# Patient Record
Sex: FEMALE | Race: WHITE | NOT HISPANIC OR LATINO | Employment: OTHER | URBAN - METROPOLITAN AREA
[De-identification: names, ages, dates, MRNs, and addresses within clinical notes are randomized per-mention and may not be internally consistent; named-entity substitution may affect disease eponyms.]

---

## 2017-04-07 ENCOUNTER — APPOINTMENT (OUTPATIENT)
Dept: CT IMAGING | Facility: CLINIC | Age: 77
End: 2017-04-07
Attending: EMERGENCY MEDICINE
Payer: MEDICARE

## 2017-04-07 ENCOUNTER — APPOINTMENT (OUTPATIENT)
Dept: ULTRASOUND IMAGING | Facility: CLINIC | Age: 77
End: 2017-04-07
Attending: EMERGENCY MEDICINE
Payer: MEDICARE

## 2017-04-07 ENCOUNTER — HOSPITAL ENCOUNTER (EMERGENCY)
Facility: CLINIC | Age: 77
Discharge: HOME | End: 2017-04-07
Attending: EMERGENCY MEDICINE | Admitting: EMERGENCY MEDICINE
Payer: MEDICARE

## 2017-04-07 VITALS
DIASTOLIC BLOOD PRESSURE: 67 MMHG | WEIGHT: 172 LBS | BODY MASS INDEX: 33.77 KG/M2 | TEMPERATURE: 98 F | OXYGEN SATURATION: 94 % | HEART RATE: 65 BPM | HEIGHT: 60 IN | SYSTOLIC BLOOD PRESSURE: 167 MMHG | RESPIRATION RATE: 16 BRPM

## 2017-04-07 DIAGNOSIS — N83.201 CYST OF RIGHT OVARY: ICD-10-CM

## 2017-04-07 DIAGNOSIS — R10.31 RLQ ABDOMINAL PAIN: ICD-10-CM

## 2017-04-07 LAB
ALBUMIN SERPL-MCNC: 3.2 G/DL (ref 3.4–5)
ALBUMIN UR-MCNC: NEGATIVE MG/DL
ALP SERPL-CCNC: 73 U/L (ref 40–150)
ALT SERPL W P-5'-P-CCNC: 25 U/L (ref 0–50)
ANION GAP SERPL CALCULATED.3IONS-SCNC: 8 MMOL/L (ref 3–14)
APPEARANCE UR: CLEAR
AST SERPL W P-5'-P-CCNC: 16 U/L (ref 0–45)
BACTERIA #/AREA URNS HPF: ABNORMAL /HPF
BASOPHILS # BLD AUTO: 0 10E9/L (ref 0–0.2)
BASOPHILS NFR BLD AUTO: 0.2 %
BILIRUB SERPL-MCNC: 0.5 MG/DL (ref 0.2–1.3)
BILIRUB UR QL STRIP: NEGATIVE
BUN SERPL-MCNC: 23 MG/DL (ref 7–30)
CALCIUM SERPL-MCNC: 8.9 MG/DL (ref 8.5–10.1)
CHLORIDE SERPL-SCNC: 104 MMOL/L (ref 94–109)
CO2 SERPL-SCNC: 31 MMOL/L (ref 20–32)
COLOR UR AUTO: YELLOW
CREAT SERPL-MCNC: 0.63 MG/DL (ref 0.52–1.04)
DIFFERENTIAL METHOD BLD: ABNORMAL
EOSINOPHIL # BLD AUTO: 0 10E9/L (ref 0–0.7)
EOSINOPHIL NFR BLD AUTO: 0.2 %
ERYTHROCYTE [DISTWIDTH] IN BLOOD BY AUTOMATED COUNT: 13.2 % (ref 10–15)
GFR SERPL CREATININE-BSD FRML MDRD: ABNORMAL ML/MIN/1.7M2
GLUCOSE SERPL-MCNC: 96 MG/DL (ref 70–99)
GLUCOSE UR STRIP-MCNC: NEGATIVE MG/DL
HCT VFR BLD AUTO: 41.7 % (ref 35–47)
HGB BLD-MCNC: 14.4 G/DL (ref 11.7–15.7)
HGB UR QL STRIP: NEGATIVE
HYALINE CASTS #/AREA URNS LPF: 1 /LPF (ref 0–2)
IMM GRANULOCYTES # BLD: 0 10E9/L (ref 0–0.4)
IMM GRANULOCYTES NFR BLD: 0.1 %
KETONES UR STRIP-MCNC: NEGATIVE MG/DL
LEUKOCYTE ESTERASE UR QL STRIP: ABNORMAL
LIPASE SERPL-CCNC: 164 U/L (ref 73–393)
LYMPHOCYTES # BLD AUTO: 1.8 10E9/L (ref 0.8–5.3)
LYMPHOCYTES NFR BLD AUTO: 21.3 %
MCH RBC QN AUTO: 33.6 PG (ref 26.5–33)
MCHC RBC AUTO-ENTMCNC: 34.5 G/DL (ref 31.5–36.5)
MCV RBC AUTO: 97 FL (ref 78–100)
MONOCYTES # BLD AUTO: 0.9 10E9/L (ref 0–1.3)
MONOCYTES NFR BLD AUTO: 10.5 %
MUCOUS THREADS #/AREA URNS LPF: PRESENT /LPF
NEUTROPHILS # BLD AUTO: 5.6 10E9/L (ref 1.6–8.3)
NEUTROPHILS NFR BLD AUTO: 67.7 %
NITRATE UR QL: NEGATIVE
NRBC # BLD AUTO: 0 10*3/UL
NRBC BLD AUTO-RTO: 0 /100
PH UR STRIP: 6.5 PH (ref 5–7)
PLATELET # BLD AUTO: 201 10E9/L (ref 150–450)
POTASSIUM SERPL-SCNC: 3.5 MMOL/L (ref 3.4–5.3)
PROT SERPL-MCNC: 6.9 G/DL (ref 6.8–8.8)
RBC # BLD AUTO: 4.28 10E12/L (ref 3.8–5.2)
RBC #/AREA URNS AUTO: 1 /HPF (ref 0–2)
SODIUM SERPL-SCNC: 143 MMOL/L (ref 133–144)
SP GR UR STRIP: 1.03 (ref 1–1.03)
SQUAMOUS #/AREA URNS AUTO: 1 /HPF (ref 0–1)
URN SPEC COLLECT METH UR: ABNORMAL
UROBILINOGEN UR STRIP-MCNC: NORMAL MG/DL (ref 0–2)
WBC # BLD AUTO: 8.2 10E9/L (ref 4–11)
WBC #/AREA URNS AUTO: 2 /HPF (ref 0–2)

## 2017-04-07 PROCEDURE — 85025 COMPLETE CBC W/AUTO DIFF WBC: CPT | Performed by: EMERGENCY MEDICINE

## 2017-04-07 PROCEDURE — 96375 TX/PRO/DX INJ NEW DRUG ADDON: CPT

## 2017-04-07 PROCEDURE — 96374 THER/PROPH/DIAG INJ IV PUSH: CPT | Mod: 59

## 2017-04-07 PROCEDURE — 25000128 H RX IP 250 OP 636: Performed by: EMERGENCY MEDICINE

## 2017-04-07 PROCEDURE — 80053 COMPREHEN METABOLIC PANEL: CPT | Performed by: EMERGENCY MEDICINE

## 2017-04-07 PROCEDURE — 96361 HYDRATE IV INFUSION ADD-ON: CPT

## 2017-04-07 PROCEDURE — 25500064 ZZH RX 255 OP 636: Performed by: EMERGENCY MEDICINE

## 2017-04-07 PROCEDURE — 25000125 ZZHC RX 250: Performed by: EMERGENCY MEDICINE

## 2017-04-07 PROCEDURE — 93976 VASCULAR STUDY: CPT

## 2017-04-07 PROCEDURE — 74177 CT ABD & PELVIS W/CONTRAST: CPT

## 2017-04-07 PROCEDURE — 83690 ASSAY OF LIPASE: CPT | Performed by: EMERGENCY MEDICINE

## 2017-04-07 PROCEDURE — 81001 URINALYSIS AUTO W/SCOPE: CPT | Performed by: EMERGENCY MEDICINE

## 2017-04-07 PROCEDURE — 96376 TX/PRO/DX INJ SAME DRUG ADON: CPT

## 2017-04-07 PROCEDURE — 99285 EMERGENCY DEPT VISIT HI MDM: CPT | Mod: 25

## 2017-04-07 RX ORDER — IOPAMIDOL 755 MG/ML
86 INJECTION, SOLUTION INTRAVASCULAR ONCE
Status: COMPLETED | OUTPATIENT
Start: 2017-04-07 | End: 2017-04-07

## 2017-04-07 RX ORDER — SODIUM CHLORIDE 9 MG/ML
INJECTION, SOLUTION INTRAVENOUS CONTINUOUS
Status: DISCONTINUED | OUTPATIENT
Start: 2017-04-07 | End: 2017-04-07 | Stop reason: HOSPADM

## 2017-04-07 RX ORDER — ONDANSETRON 2 MG/ML
4 INJECTION INTRAMUSCULAR; INTRAVENOUS ONCE
Status: COMPLETED | OUTPATIENT
Start: 2017-04-07 | End: 2017-04-07

## 2017-04-07 RX ORDER — KETOROLAC TROMETHAMINE 15 MG/ML
15 INJECTION, SOLUTION INTRAMUSCULAR; INTRAVENOUS ONCE
Status: COMPLETED | OUTPATIENT
Start: 2017-04-07 | End: 2017-04-07

## 2017-04-07 RX ORDER — MORPHINE SULFATE 2 MG/ML
2 INJECTION, SOLUTION INTRAMUSCULAR; INTRAVENOUS
Status: COMPLETED | OUTPATIENT
Start: 2017-04-07 | End: 2017-04-07

## 2017-04-07 RX ORDER — HYDROCODONE BITARTRATE AND ACETAMINOPHEN 5; 325 MG/1; MG/1
1-2 TABLET ORAL EVERY 4 HOURS PRN
Qty: 12 TABLET | Refills: 0 | Status: ON HOLD | OUTPATIENT
Start: 2017-04-07 | End: 2019-03-25

## 2017-04-07 RX ORDER — MORPHINE SULFATE 2 MG/ML
2 INJECTION, SOLUTION INTRAMUSCULAR; INTRAVENOUS ONCE
Status: COMPLETED | OUTPATIENT
Start: 2017-04-07 | End: 2017-04-07

## 2017-04-07 RX ADMIN — SODIUM CHLORIDE: 9 INJECTION, SOLUTION INTRAVENOUS at 03:10

## 2017-04-07 RX ADMIN — ONDANSETRON 4 MG: 2 SOLUTION INTRAMUSCULAR; INTRAVENOUS at 03:14

## 2017-04-07 RX ADMIN — IOPAMIDOL 86 ML: 755 INJECTION, SOLUTION INTRAVENOUS at 03:49

## 2017-04-07 RX ADMIN — KETOROLAC TROMETHAMINE 15 MG: 15 INJECTION, SOLUTION INTRAMUSCULAR; INTRAVENOUS at 06:33

## 2017-04-07 RX ADMIN — MORPHINE SULFATE 2 MG: 2 INJECTION, SOLUTION INTRAMUSCULAR; INTRAVENOUS at 04:38

## 2017-04-07 RX ADMIN — MORPHINE SULFATE 2 MG: 2 INJECTION, SOLUTION INTRAMUSCULAR; INTRAVENOUS at 03:08

## 2017-04-07 RX ADMIN — SODIUM CHLORIDE 66 ML: 9 INJECTION, SOLUTION INTRAVENOUS at 03:50

## 2017-04-07 ASSESSMENT — ENCOUNTER SYMPTOMS
DIARRHEA: 1
TREMORS: 1
VOMITING: 0
NAUSEA: 1
CHILLS: 1
ABDOMINAL PAIN: 1

## 2017-04-07 NOTE — ED NOTES
Bed: ED25  Expected date: 4/7/17  Expected time:   Means of arrival: Ambulance  Comments:  Rae 57/F side&back pain

## 2017-04-07 NOTE — ED PROVIDER NOTES
"  History     Chief Complaint:  Abdominal pain    HPI   Nan Sanford is a 77 year old female with a history of hypertension, ruptured ovarian cyst and polymyalgia rheumatica who presents to the ED via EMS for evaluation of abdominal pain. The patient complains of a persistent RLQ abdominal pain that wraps around to her right side that started 2 days ago and worsened tonight. She states that the pain feels like \"pressure\". She has been having a hard time finding a comfortable position to sleep in. The pain is not exacerbated with food intake. In addition to the pain, she admits to shakiness, chills, nausea and diarrhea. She denies vomiting. The patient also reports green, stringy stools lately. She worries it is her appendix, also states she had a ruptured ovarian cyst a couple years ago which may have felt similar.     Allergies:  No known drug allergies.     Medications:    Tylenol  Deltasone  Trazadone  Plaquenil  Vitamin D3  Caltrate  Fish oil  Atenolol  Microzide     Past Medical History:    Hypertension  Ovarian cyst  Polymyalgia rheumatica    Past Surgical History:    Breast surgery  ENT surgery  Ruptured ovarian cyst  Orthopedic surgery    Family History:    History reviewed. No pertinent family history.    Social History:  Marital Status: single  Presents to the ED via EMS  Tobacco Use: No  Alcohol Use: No  PCP: Jessica Menjivar     Review of Systems   Constitutional: Positive for chills.   Gastrointestinal: Positive for abdominal pain, diarrhea and nausea. Negative for vomiting.   Neurological: Positive for tremors.   All other systems reviewed and are negative.      Physical Exam   First Vitals:  BP: 192/79  Pulse: 70  Temp: 98  F (36.7  C)  Resp: 16  Height: 153 cm (5' 0.25\")  Weight: 78 kg (172 lb)  SpO2: 98 %    Vitals:    04/07/17 0213 04/07/17 0319 04/07/17 0632   BP: 192/79 179/84 167/67   Pulse: 70 59 65   Resp: 16     Temp: 98  F (36.7  C)     TempSrc: Oral     SpO2: 98% 94%    Weight: 78 kg (172 lb)   " "  Height: 1.53 m (5' 0.25\")           Physical Exam     Constitutional:  Patient is oriented to person, place, and time. They appear well-developed and well-nourished. Mild distress secondary to abdominal pain.   HENT:   Mouth/Throat:   Oropharynx is clear and moist.   Eyes:    Conjunctivae normal and EOM are normal. Pupils are equal, round, and reactive to light.   Neck:    Normal range of motion.   Cardiovascular: Normal rate, regular rhythm and normal heart sounds.  Exam reveals no gallop and no friction rub.  No murmur heard.  Pulmonary/Chest:  Effort normal and breath sounds normal. Patient has no wheezes. Patient has no rales.   Abdominal:   Patient indicated right lower quadrant pain where she has discomfort, but speaking to her and palpating, I do no elicit any pain. There is no rebound and no guarding.   Musculoskeletal:  Normal range of motion. Patient exhibits no edema.   Neurological:   Patient is alert and oriented to person, place, and time. Patient has normal strength. No cranial nerve deficit or sensory deficit. GCS 15.  Skin:   Skin is warm and dry. No rash noted. No erythema.   Psychiatric:   Patient has a normal mood and affect. Patient's behavior is normal. Judgment and thought content normal.       Emergency Department Course     Imaging:    CT Abdomen/Pelvis w Contrast  No acute abnormality. No bowel obstruction or  inflammation.  Report per radiology.    Transvaginal Pelvic US w/Duplex  1. 1.3 cm right ovarian cyst.  2. Otherwise normal pelvis. The left ovary is not seen.  Report per radiology.    Radiographic findings were communicated with the patient who voiced understanding of the findings.    Laboratory:  CBC:  WBC 8.2, HGB 14.4,   CMP: Albumin 3.2 (L), otherwise WNL (Creatinine 0.63)  Lipase: 164    UA: Clear yellow urine, small leukocyte esterase, few bacteria, mucous present, otherwise WNL    Interventions:  (0308) Morphine, 2 mg, IV  (0310) Normal Saline, 1 liter, IV " bolus  (0314) Zofran, 4 mg, IV injection  (1018) Morphine, 2 mg, IV    Emergency Department Course:  Nursing notes and vitals reviewed.  I performed an exam of the patient as documented above. GCS 15.    A peripheral IV was established. Blood was drawn from the patient. This was sent for laboratory testing, findings above. Urine sample was obtained and sent for laboratory analysis, findings above.    The patient was sent for a CT abdomen/pelvis while in the emergency department, findings above.     (2331) I updated the patient on all of the lab and imaging results. US ordered.    (0538) I rechecked the patient.    Findings and plan explained to the patient. Patient discharged home with instructions regarding supportive care, medications, and reasons to return. The importance of close follow-up was reviewed. The patient was prescribed norco.    I personally reviewed the laboratory results with the patient and answered all related questions prior to discharge.       Impression & Plan      Medical Decision Making:  Nan Sanford is a 77 year old female who presents with RLQ pain, feeling like something was inside her. She is concerned about her appendix. Her abdominal exam did not reflect a surgical abdomen. CT of her abdomen as well as basic blood work was obtained. There is no evidence of mass, obstruction, inflammation, infection, vascular abnormality or kidney stone. Urine is not significantly infected. Her WBC count and metabolic panel are normal. I did do an ultrasound of her pelvis as she her pain did feel similar as to when she had a ruptured ovarian cyst a few years ago. There is a small right ovarian cyst at 1.3 cm. No torsion. It has not ruptured. The patient was made aware of the ovarian cyst and she is fairly certain that this is what is causing her pain as she had similar pain prior to the rupture of her ovarian cyst previously. It is noted that she is hypertensive. She takes metoprolol in the morning and  she always has fairly well-controlled blood pressure, so I suspect this is secondary to discomfort. At this time, I feel she is safe for discharge. I will write her for some pain medication. I will refer her to a different gynecologist as she was not happy with her previous gynecologist. She should return if she has any worsening symptoms.       Diagnosis:    ICD-10-CM    1. RLQ abdominal pain R10.31    2. Cyst of right ovary N83.201        Disposition:  Discharged to home    Discharge Medications:  New Prescriptions    HYDROCODONE-ACETAMINOPHEN (NORCO) 5-325 MG PER TABLET    Take 1-2 tablets by mouth every 4 hours as needed for moderate to severe pain       I, Jose L Fontaine, am serving as a scribe on 4/7/2017 at 2:15 AM to personally document services performed by Dr. Andrew MD based on my observations and the provider's statements to me.     4/7/2017    EMERGENCY DEPARTMENT       Mona Morales MD  04/09/17 1201

## 2017-04-07 NOTE — ED AVS SNAPSHOT
Emergency Department    [address] [facility] [address]    Phone:  [phone]    Fax:  [phone]                                       Nan Sanford   MRN: [id number]    Department:   Emergency Department   Date of Visit:  4/7/2017           Patient Information     Date Of Birth          1940        Your diagnoses for this visit were:     RLQ abdominal pain     Cyst of right ovary        You were seen by Mona Morales MD.      Follow-up Information     Follow up with Gisela Estrada MD In 2 days.    Specialty:  OB/Gyn    Contact information:    [facility]  [address] [address] [phone]        Discharge References/Attachments     ABDOMINAL PAIN, ADULT (ENGLISH)    OVARIAN CYST (ENGLISH)      24 Hour Appointment Hotline       To make an appointment at any [facility] clinic, call [phone] ([phone]). If you don't have a family doctor or clinic, we will help you find one. [facility] clinics are conveniently located to serve the needs of you and your family.             Review of your medicines      START taking        Dose / Directions Last dose taken    HYDROcodone-acetaminophen 5-325 MG per tablet   Commonly known as:  NORCO   Dose:  1-2 tablet   Quantity:  12 tablet        Take 1-2 tablets by mouth every 4 hours as needed for moderate to severe pain   Refills:  0          Our records show that you are taking the medicines listed below. If these are incorrect, please call your family doctor or clinic.        Dose / Directions Last dose taken    acetaminophen 500 MG tablet   Commonly known as:  TYLENOL   Dose:  1000 mg   Quantity:  1 Bottle        Take 2 tablets (1,000 mg) by mouth 3 times daily   Refills:  0        ATENOLOL PO   Dose:  50 mg        Take 50 mg by mouth daily   Refills:  0        calcium-vitamin D 600-400 MG-UNIT per tablet   Commonly known as:  CALTRATE   Dose:  1 tablet        Take 1 tablet by mouth  daily   Refills:  0        flax seed oil 1000 MG capsule   Dose:  1 capsule        Take 1 capsule by mouth daily   Refills:  0        hydrochlorothiazide 12.5 MG capsule   Commonly known as:  MICROZIDE   Dose:  12.5 mg        Take 12.5 mg by mouth daily   Refills:  0        hydroxychloroquine 200 MG tablet   Commonly known as:  PLAQUENIL   Dose:  400 mg        Take 400 mg by mouth daily   Refills:  0        OMEGA-3 FISH OIL PO   Dose:  1 g        Take 1 g by mouth daily   Refills:  0        * order for DME   Quantity:  1 Device        Equipment being ordered: Walker () Treatment Diagnosis: Acute flare of polymyalgia rheumatica   Refills:  0        * order for DME   Quantity:  1 each        Equipment being ordered: Walker Wheels () and Walker () Treatment Diagnosis: Difficulty ambulating   Refills:  0        predniSONE 5 MG tablet   Commonly known as:  DELTASONE   Dose:  20 mg   Quantity:  60 tablet        Take 4 tablets (20 mg) by mouth daily   Refills:  1        TRAZODONE HCL PO   Dose:  100 mg        Take 100 mg by mouth At Bedtime   Refills:  0        VITAMIN D3 PO   Dose:  5000 Units        Take 5,000 Units by mouth daily   Refills:  0        * Notice:  This list has 2 medication(s) that are the same as other medications prescribed for you. Read the directions carefully, and ask your doctor or other care provider to review them with you.            Prescriptions were sent or printed at these locations (1 Prescription)                   Other Prescriptions                Printed at Department/Unit printer (1 of 1)         HYDROcodone-acetaminophen (NORCO) 5-325 MG per tablet                Procedures and tests performed during your visit     CBC with platelets differential    CT Abdomen Pelvis w Contrast    Comprehensive metabolic panel    Lipase    UA reflex to Microscopic    US Pelvic Complete w Transvaginal & Abd/Pel Duplex Limited      Orders Needing Specimen Collection     None      Pending  Results     Date and Time Order Name Status Description    4/7/2017 0434 US Pelvic Complete w Transvaginal & Abd/Pel Duplex Limited Preliminary     4/7/2017 0234 CT Abdomen Pelvis w Contrast Preliminary             Pending Culture Results     No orders found from 4/5/2017 to 4/8/2017.            Test Results From Your Hospital Stay        4/7/2017  3:15 AM      Component Results     Component Value Ref Range & Units Status    WBC 8.2 4.0 - 11.0 10e9/L Final    RBC Count 4.28 3.8 - 5.2 10e12/L Final    Hemoglobin 14.4 11.7 - 15.7 g/dL Final    Hematocrit 41.7 35.0 - 47.0 % Final    MCV 97 78 - 100 fl Final    MCH 33.6 (H) 26.5 - 33.0 pg Final    MCHC 34.5 31.5 - 36.5 g/dL Final    RDW 13.2 10.0 - 15.0 % Final    Platelet Count 201 150 - 450 10e9/L Final    Diff Method Automated Method  Final    % Neutrophils 67.7 % Final    % Lymphocytes 21.3 % Final    % Monocytes 10.5 % Final    % Eosinophils 0.2 % Final    % Basophils 0.2 % Final    % Immature Granulocytes 0.1 % Final    Nucleated RBCs 0 0 /100 Final    Absolute Neutrophil 5.6 1.6 - 8.3 10e9/L Final    Absolute Lymphocytes 1.8 0.8 - 5.3 10e9/L Final    Absolute Monocytes 0.9 0.0 - 1.3 10e9/L Final    Absolute Eosinophils 0.0 0.0 - 0.7 10e9/L Final    Absolute Basophils 0.0 0.0 - 0.2 10e9/L Final    Abs Immature Granulocytes 0.0 0 - 0.4 10e9/L Final    Absolute Nucleated RBC 0.0  Final         4/7/2017  3:30 AM      Component Results     Component Value Ref Range & Units Status    Sodium 143 133 - 144 mmol/L Final    Potassium 3.5 3.4 - 5.3 mmol/L Final    Chloride 104 94 - 109 mmol/L Final    Carbon Dioxide 31 20 - 32 mmol/L Final    Anion Gap 8 3 - 14 mmol/L Final    Glucose 96 70 - 99 mg/dL Final    Urea Nitrogen 23 7 - 30 mg/dL Final    Creatinine 0.63 0.52 - 1.04 mg/dL Final    GFR Estimate >90  Non  GFR Calc   >60 mL/min/1.7m2 Final    GFR Estimate If Black >90   GFR Calc   >60 mL/min/1.7m2 Final    Calcium 8.9 8.5 - 10.1 mg/dL  Final    Bilirubin Total 0.5 0.2 - 1.3 mg/dL Final    Albumin 3.2 (L) 3.4 - 5.0 g/dL Final    Protein Total 6.9 6.8 - 8.8 g/dL Final    Alkaline Phosphatase 73 40 - 150 U/L Final    ALT 25 0 - 50 U/L Final    AST 16 0 - 45 U/L Final         4/7/2017  3:28 AM      Component Results     Component Value Ref Range & Units Status    Lipase 164 73 - 393 U/L Final         4/7/2017  4:12 AM      Narrative     CT ABDOMEN PELVIS W CONTRAST  4/7/2017 3:53 AM      HISTORY: Right lower quadrant pain.    TECHNIQUE: CT abdomen and pelvis with intravenous contrast. Radiation  dose for this scan was reduced using automated exposure control,  adjustment of the mA and/or kV according to patient size, or iterative  reconstruction technique. 86 mL Isouve-370.     COMPARISON: 9/22/2015.    FINDINGS:  Abdomen: There is dependent atelectasis and curvilinear scarring at  the lung bases. The heart size is normal. The liver, spleen,  gallbladder, pancreas, adrenal glands and right kidney are normal in  appearance. There are two tiny probable cortical cysts in the left  kidney. There is no abdominal or pelvic lymph node enlargement. There  is atherosclerotic calcification of the aorta and its branches. No  aneurysm.    Pelvis: The uterus and adnexa appear normal. There is no bowel  obstruction or inflammation. Circumferential wall thickening of the  gastric antrum may be due to peristaltic wave. No free intraperitoneal  gas or fluid. A few pelvic phleboliths. Degenerative disease and  scoliosis in the spine.        Impression     IMPRESSION: No acute abnormality. No bowel obstruction or  inflammation.         4/7/2017  4:33 AM      Component Results     Component Value Ref Range & Units Status    Color Urine Yellow  Final    Appearance Urine Clear  Final    Glucose Urine Negative NEG mg/dL Final    Bilirubin Urine Negative NEG Final    Ketones Urine Negative NEG mg/dL Final    Specific Gravity Urine 1.030 1.003 - 1.035 Final    Blood Urine  Negative NEG Final    pH Urine 6.5 5.0 - 7.0 pH Final    Protein Albumin Urine Negative NEG mg/dL Final    Urobilinogen mg/dL Normal 0.0 - 2.0 mg/dL Final    Nitrite Urine Negative NEG Final    Leukocyte Esterase Urine Small (A) NEG Final    Source Midstream Urine  Final    RBC Urine 1 0 - 2 /HPF Final    WBC Urine 2 0 - 2 /HPF Final    Bacteria Urine Few (A) NEG /HPF Final    Squamous Epithelial /HPF Urine 1 0 - 1 /HPF Final    Mucous Urine Present (A) NEG /LPF Final    Hyaline Casts 1 0 - 2 /LPF Final         4/7/2017  6:00 AM      Narrative     US PELVIS COMPLETE W TRANSVAGINAL AND DOPPLER LIMITED  4/7/2017 5:47  AM      HISTORY: Right pelvic pain.     COMPARISON: CT 4/7/2017.    FINDINGS: The uterus is normal in size and position measuring 5.0 x  3.9 x 3.4 cm. The endometrium is difficult to identify but is not  thickened. The left ovary is not seen. There is a cyst in the right  ovary measuring 1.3 x 1.2 x 1.2 cm. No adnexal mass. No free pelvic  fluid.        Impression     IMPRESSION:  1. 1.3 cm right ovarian cyst.  2. Otherwise normal pelvis. The left ovary is not seen.                Clinical Quality Measure: Blood Pressure Screening     Your blood pressure was checked while you were in the emergency department today. The last reading we obtained was  BP: 179/84 . Please read the guidelines below about what these numbers mean and what you should do about them.  If your systolic blood pressure (the top number) is less than 120 and your diastolic blood pressure (the bottom number) is less than 80, then your blood pressure is normal. There is nothing more that you need to do about it.  If your systolic blood pressure (the top number) is 120-139 or your diastolic blood pressure (the bottom number) is 80-89, your blood pressure may be higher than it should be. You should have your blood pressure rechecked within a year by a primary care provider.  If your systolic blood pressure (the top number) is 140 or  "greater or your diastolic blood pressure (the bottom number) is 90 or greater, you may have high blood pressure. High blood pressure is treatable, but if left untreated over time it can put you at risk for heart attack, stroke, or kidney failure. You should have your blood pressure rechecked by a primary care provider within the next 4 weeks.  If your provider in the emergency department today gave you specific instructions to follow-up with your doctor or provider even sooner than that, you should follow that instruction and not wait for up to 4 weeks for your follow-up visit.        Thank you for choosing [facility]       Thank you for choosing [facility] for your care. Our goal is always to provide you with excellent care. Hearing back from our patients is one way we can continue to improve our services. Please take a few minutes to complete the written survey that you may receive in the mail after you visit with us. Thank you!        [facility] Information     [facility] lets you send messages to your doctor, view your test results, renew your prescriptions, schedule appointments and more. To sign up, go to [email or web address] . Click on \"Log in\" on the left side of the screen, which will take you to the Welcome page. Then click on \"Sign up Now\" on the right side of the page.     You will be asked to enter the access code listed below, as well as some personal information. Please follow the directions to create your username and password.     Your access code is: [email or web address]  Expires: 2017  6:46 AM     Your access code will  in 90 days. If you need help or a new code, please call your [facility] clinic or [phone].        Care EveryWhere ID     This is your Care EveryWhere ID. This could be used by other organizations to access your [facility] medical records  [id number]        After Visit Summary       This is your record. Keep this with you and show to your community pharmacist(s) and doctor(s) at your " next visit.

## 2017-04-07 NOTE — ED AVS SNAPSHOT
Emergency Department    [address] [facility] [address]    Phone:  [phone]    Fax:  [phone]                                       Nan Sanford   MRN: [id number]    Department:   Emergency Department   Date of Visit:  4/7/2017           After Visit Summary Signature Page     I have received my discharge instructions, and my questions have been answered. I have discussed any challenges I see with this plan with the nurse or doctor.    ..........................................................................................................................................  Patient/Patient Representative Signature      ..........................................................................................................................................  Patient Representative Print Name and Relationship to Patient    ..................................................               ................................................  Date                                            Time    ..........................................................................................................................................  Reviewed by Signature/Title    ...................................................              ..............................................  Date                                                            Time

## 2019-03-24 ENCOUNTER — HOSPITAL ENCOUNTER (OUTPATIENT)
Facility: CLINIC | Age: 79
Setting detail: OBSERVATION
Discharge: HOME HEALTH | End: 2019-03-27
Attending: EMERGENCY MEDICINE | Admitting: INTERNAL MEDICINE
Payer: MEDICARE

## 2019-03-24 DIAGNOSIS — M54.42 ACUTE RIGHT-SIDED LOW BACK PAIN WITH LEFT-SIDED SCIATICA: ICD-10-CM

## 2019-03-24 DIAGNOSIS — Z79.631 METHOTREXATE, LONG TERM, CURRENT USE: ICD-10-CM

## 2019-03-24 DIAGNOSIS — M54.5 ACUTE MIDLINE LOW BACK PAIN, WITH SCIATICA PRESENCE UNSPECIFIED: Primary | ICD-10-CM

## 2019-03-24 DIAGNOSIS — R03.0 ELEVATED BLOOD PRESSURE READING: ICD-10-CM

## 2019-03-24 DIAGNOSIS — R10.9 RIGHT SIDED ABDOMINAL PAIN: ICD-10-CM

## 2019-03-24 PROCEDURE — 96374 THER/PROPH/DIAG INJ IV PUSH: CPT | Mod: 59

## 2019-03-24 PROCEDURE — 96375 TX/PRO/DX INJ NEW DRUG ADDON: CPT

## 2019-03-24 PROCEDURE — 99285 EMERGENCY DEPT VISIT HI MDM: CPT | Mod: 25

## 2019-03-24 ASSESSMENT — MIFFLIN-ST. JEOR: SCORE: 1281.47

## 2019-03-25 ENCOUNTER — APPOINTMENT (OUTPATIENT)
Dept: CT IMAGING | Facility: CLINIC | Age: 79
End: 2019-03-25
Attending: EMERGENCY MEDICINE
Payer: MEDICARE

## 2019-03-25 ENCOUNTER — APPOINTMENT (OUTPATIENT)
Dept: PHYSICAL THERAPY | Facility: CLINIC | Age: 79
End: 2019-03-25
Attending: INTERNAL MEDICINE
Payer: MEDICARE

## 2019-03-25 ENCOUNTER — APPOINTMENT (OUTPATIENT)
Dept: GENERAL RADIOLOGY | Facility: CLINIC | Age: 79
End: 2019-03-25
Attending: PHYSICIAN ASSISTANT
Payer: MEDICARE

## 2019-03-25 PROBLEM — M54.9 ACUTE BACK PAIN: Status: ACTIVE | Noted: 2019-03-25

## 2019-03-25 LAB
ALBUMIN SERPL-MCNC: 3.1 G/DL (ref 3.4–5)
ALP SERPL-CCNC: 74 U/L (ref 40–150)
ALT SERPL W P-5'-P-CCNC: 34 U/L (ref 0–50)
ANION GAP SERPL CALCULATED.3IONS-SCNC: 5 MMOL/L (ref 3–14)
AST SERPL W P-5'-P-CCNC: 35 U/L (ref 0–45)
BASOPHILS # BLD AUTO: 0 10E9/L (ref 0–0.2)
BASOPHILS NFR BLD AUTO: 0.4 %
BILIRUB SERPL-MCNC: 0.1 MG/DL (ref 0.2–1.3)
BUN SERPL-MCNC: 29 MG/DL (ref 7–30)
CALCIUM SERPL-MCNC: 8.4 MG/DL (ref 8.5–10.1)
CHLORIDE SERPL-SCNC: 108 MMOL/L (ref 94–109)
CK SERPL-CCNC: 79 U/L (ref 30–225)
CO2 SERPL-SCNC: 27 MMOL/L (ref 20–32)
CREAT BLD-MCNC: 0.7 MG/DL (ref 0.52–1.04)
CREAT SERPL-MCNC: 0.74 MG/DL (ref 0.52–1.04)
CRP SERPL-MCNC: 7.7 MG/L (ref 0–8)
DIFFERENTIAL METHOD BLD: NORMAL
EOSINOPHIL # BLD AUTO: 0.1 10E9/L (ref 0–0.7)
EOSINOPHIL NFR BLD AUTO: 1 %
ERYTHROCYTE [DISTWIDTH] IN BLOOD BY AUTOMATED COUNT: 12.9 % (ref 10–15)
ERYTHROCYTE [SEDIMENTATION RATE] IN BLOOD BY WESTERGREN METHOD: 16 MM/H (ref 0–30)
GFR SERPL CREATININE-BSD FRML MDRD: 77 ML/MIN/{1.73_M2}
GFR SERPL CREATININE-BSD FRML MDRD: 81 ML/MIN/{1.73_M2}
GLUCOSE SERPL-MCNC: 109 MG/DL (ref 70–99)
HCT VFR BLD AUTO: 38.8 % (ref 35–47)
HGB BLD-MCNC: 13.1 G/DL (ref 11.7–15.7)
IMM GRANULOCYTES # BLD: 0 10E9/L (ref 0–0.4)
IMM GRANULOCYTES NFR BLD: 0.1 %
LIPASE SERPL-CCNC: 228 U/L (ref 73–393)
LYMPHOCYTES # BLD AUTO: 1.7 10E9/L (ref 0.8–5.3)
LYMPHOCYTES NFR BLD AUTO: 23.4 %
MCH RBC QN AUTO: 32.9 PG (ref 26.5–33)
MCHC RBC AUTO-ENTMCNC: 33.8 G/DL (ref 31.5–36.5)
MCV RBC AUTO: 98 FL (ref 78–100)
MONOCYTES # BLD AUTO: 0.7 10E9/L (ref 0–1.3)
MONOCYTES NFR BLD AUTO: 9.5 %
NEUTROPHILS # BLD AUTO: 4.8 10E9/L (ref 1.6–8.3)
NEUTROPHILS NFR BLD AUTO: 65.6 %
PLATELET # BLD AUTO: 203 10E9/L (ref 150–450)
POTASSIUM SERPL-SCNC: 4.4 MMOL/L (ref 3.4–5.3)
PROT SERPL-MCNC: 6.6 G/DL (ref 6.8–8.8)
RBC # BLD AUTO: 3.98 10E12/L (ref 3.8–5.2)
SODIUM SERPL-SCNC: 140 MMOL/L (ref 133–144)
TROPONIN I SERPL-MCNC: <0.015 UG/L (ref 0–0.04)
WBC # BLD AUTO: 7.3 10E9/L (ref 4–11)

## 2019-03-25 PROCEDURE — 25000128 H RX IP 250 OP 636: Performed by: EMERGENCY MEDICINE

## 2019-03-25 PROCEDURE — 99207 ZZC CDG-CODE CATEGORY CHANGED: CPT | Performed by: INTERNAL MEDICINE

## 2019-03-25 PROCEDURE — 25000128 H RX IP 250 OP 636: Performed by: PHYSICIAN ASSISTANT

## 2019-03-25 PROCEDURE — 96374 THER/PROPH/DIAG INJ IV PUSH: CPT | Mod: 59

## 2019-03-25 PROCEDURE — G0378 HOSPITAL OBSERVATION PER HR: HCPCS

## 2019-03-25 PROCEDURE — 99207 ZZC APP CREDIT; MD BILLING SHARED VISIT: CPT | Performed by: HOSPITALIST

## 2019-03-25 PROCEDURE — 25000132 ZZH RX MED GY IP 250 OP 250 PS 637: Mod: GY | Performed by: INTERNAL MEDICINE

## 2019-03-25 PROCEDURE — 82565 ASSAY OF CREATININE: CPT | Mod: 91

## 2019-03-25 PROCEDURE — 82550 ASSAY OF CK (CPK): CPT | Performed by: EMERGENCY MEDICINE

## 2019-03-25 PROCEDURE — 25000132 ZZH RX MED GY IP 250 OP 250 PS 637: Performed by: EMERGENCY MEDICINE

## 2019-03-25 PROCEDURE — A9270 NON-COVERED ITEM OR SERVICE: HCPCS | Mod: GY | Performed by: PHYSICIAN ASSISTANT

## 2019-03-25 PROCEDURE — 86140 C-REACTIVE PROTEIN: CPT | Performed by: EMERGENCY MEDICINE

## 2019-03-25 PROCEDURE — A9270 NON-COVERED ITEM OR SERVICE: HCPCS | Mod: GY | Performed by: EMERGENCY MEDICINE

## 2019-03-25 PROCEDURE — 25000132 ZZH RX MED GY IP 250 OP 250 PS 637: Mod: GY | Performed by: PHYSICIAN ASSISTANT

## 2019-03-25 PROCEDURE — 85025 COMPLETE CBC W/AUTO DIFF WBC: CPT | Performed by: EMERGENCY MEDICINE

## 2019-03-25 PROCEDURE — 97161 PT EVAL LOW COMPLEX 20 MIN: CPT | Mod: GP

## 2019-03-25 PROCEDURE — 74174 CTA ABD&PLVS W/CONTRAST: CPT

## 2019-03-25 PROCEDURE — 96375 TX/PRO/DX INJ NEW DRUG ADDON: CPT | Mod: 59

## 2019-03-25 PROCEDURE — 99220 ZZC INITIAL OBSERVATION CARE,LEVL III: CPT | Performed by: INTERNAL MEDICINE

## 2019-03-25 PROCEDURE — 25000125 ZZHC RX 250: Performed by: EMERGENCY MEDICINE

## 2019-03-25 PROCEDURE — 83690 ASSAY OF LIPASE: CPT | Performed by: EMERGENCY MEDICINE

## 2019-03-25 PROCEDURE — 72070 X-RAY EXAM THORAC SPINE 2VWS: CPT

## 2019-03-25 PROCEDURE — 80053 COMPREHEN METABOLIC PANEL: CPT | Performed by: EMERGENCY MEDICINE

## 2019-03-25 PROCEDURE — 97530 THERAPEUTIC ACTIVITIES: CPT | Mod: GP

## 2019-03-25 PROCEDURE — 25000132 ZZH RX MED GY IP 250 OP 250 PS 637: Mod: GY | Performed by: HOSPITALIST

## 2019-03-25 PROCEDURE — 85652 RBC SED RATE AUTOMATED: CPT | Performed by: HOSPITALIST

## 2019-03-25 PROCEDURE — 84484 ASSAY OF TROPONIN QUANT: CPT | Performed by: EMERGENCY MEDICINE

## 2019-03-25 PROCEDURE — 36415 COLL VENOUS BLD VENIPUNCTURE: CPT | Performed by: HOSPITALIST

## 2019-03-25 PROCEDURE — 72100 X-RAY EXAM L-S SPINE 2/3 VWS: CPT

## 2019-03-25 PROCEDURE — 97116 GAIT TRAINING THERAPY: CPT | Mod: GP

## 2019-03-25 PROCEDURE — A9270 NON-COVERED ITEM OR SERVICE: HCPCS | Mod: GY | Performed by: INTERNAL MEDICINE

## 2019-03-25 PROCEDURE — A9270 NON-COVERED ITEM OR SERVICE: HCPCS | Mod: GY | Performed by: HOSPITALIST

## 2019-03-25 RX ORDER — TRAZODONE HYDROCHLORIDE 50 MG/1
100 TABLET, FILM COATED ORAL AT BEDTIME
Status: DISCONTINUED | OUTPATIENT
Start: 2019-03-25 | End: 2019-03-25

## 2019-03-25 RX ORDER — TIZANIDINE 2 MG/1
2 TABLET ORAL ONCE
Status: COMPLETED | OUTPATIENT
Start: 2019-03-25 | End: 2019-03-25

## 2019-03-25 RX ORDER — HYDRALAZINE HYDROCHLORIDE 10 MG/1
10 TABLET, FILM COATED ORAL 4 TIMES DAILY PRN
Status: DISCONTINUED | OUTPATIENT
Start: 2019-03-25 | End: 2019-03-27 | Stop reason: HOSPADM

## 2019-03-25 RX ORDER — FOLIC ACID 1 MG/1
1 TABLET ORAL DAILY
COMMUNITY

## 2019-03-25 RX ORDER — AMOXICILLIN 250 MG
1 CAPSULE ORAL 2 TIMES DAILY
Status: DISCONTINUED | OUTPATIENT
Start: 2019-03-25 | End: 2019-03-27 | Stop reason: HOSPADM

## 2019-03-25 RX ORDER — KETOROLAC TROMETHAMINE 15 MG/ML
15 INJECTION, SOLUTION INTRAMUSCULAR; INTRAVENOUS EVERY 6 HOURS PRN
Status: DISCONTINUED | OUTPATIENT
Start: 2019-03-25 | End: 2019-03-27 | Stop reason: HOSPADM

## 2019-03-25 RX ORDER — LIDOCAINE 4 G/G
2 PATCH TOPICAL
Status: DISCONTINUED | OUTPATIENT
Start: 2019-03-25 | End: 2019-03-25

## 2019-03-25 RX ORDER — ACETAMINOPHEN 325 MG/1
650 TABLET ORAL ONCE
Status: COMPLETED | OUTPATIENT
Start: 2019-03-25 | End: 2019-03-25

## 2019-03-25 RX ORDER — MAGNESIUM CARB/ALUMINUM HYDROX 105-160MG
148 TABLET,CHEWABLE ORAL
Status: DISCONTINUED | OUTPATIENT
Start: 2019-03-25 | End: 2019-03-27 | Stop reason: HOSPADM

## 2019-03-25 RX ORDER — MORPHINE SULFATE 4 MG/ML
2 INJECTION, SOLUTION INTRAMUSCULAR; INTRAVENOUS ONCE
Status: DISCONTINUED | OUTPATIENT
Start: 2019-03-25 | End: 2019-03-25

## 2019-03-25 RX ORDER — ALENDRONATE SODIUM 70 MG/1
70 TABLET ORAL
COMMUNITY

## 2019-03-25 RX ORDER — ACETAMINOPHEN 650 MG/1
650 SUPPOSITORY RECTAL EVERY 4 HOURS PRN
Status: DISCONTINUED | OUTPATIENT
Start: 2019-03-25 | End: 2019-03-25

## 2019-03-25 RX ORDER — MORPHINE SULFATE 4 MG/ML
2 INJECTION, SOLUTION INTRAMUSCULAR; INTRAVENOUS ONCE
Status: COMPLETED | OUTPATIENT
Start: 2019-03-25 | End: 2019-03-25

## 2019-03-25 RX ORDER — TIZANIDINE 2 MG/1
2 TABLET ORAL EVERY 8 HOURS PRN
Status: DISCONTINUED | OUTPATIENT
Start: 2019-03-25 | End: 2019-03-26

## 2019-03-25 RX ORDER — HYDROCHLOROTHIAZIDE 12.5 MG/1
12.5 CAPSULE ORAL DAILY
Status: DISCONTINUED | OUTPATIENT
Start: 2019-03-25 | End: 2019-03-27 | Stop reason: HOSPADM

## 2019-03-25 RX ORDER — ONDANSETRON 2 MG/ML
4 INJECTION INTRAMUSCULAR; INTRAVENOUS EVERY 6 HOURS PRN
Status: DISCONTINUED | OUTPATIENT
Start: 2019-03-25 | End: 2019-03-27 | Stop reason: HOSPADM

## 2019-03-25 RX ORDER — ACETAMINOPHEN 325 MG/1
975 TABLET ORAL
Status: DISCONTINUED | OUTPATIENT
Start: 2019-03-25 | End: 2019-03-27 | Stop reason: HOSPADM

## 2019-03-25 RX ORDER — BISACODYL 5 MG
10 TABLET, DELAYED RELEASE (ENTERIC COATED) ORAL DAILY PRN
Status: DISCONTINUED | OUTPATIENT
Start: 2019-03-25 | End: 2019-03-27 | Stop reason: HOSPADM

## 2019-03-25 RX ORDER — ONDANSETRON 4 MG/1
4 TABLET, ORALLY DISINTEGRATING ORAL EVERY 6 HOURS PRN
Status: DISCONTINUED | OUTPATIENT
Start: 2019-03-25 | End: 2019-03-27 | Stop reason: HOSPADM

## 2019-03-25 RX ORDER — NALOXONE HYDROCHLORIDE 0.4 MG/ML
.1-.4 INJECTION, SOLUTION INTRAMUSCULAR; INTRAVENOUS; SUBCUTANEOUS
Status: DISCONTINUED | OUTPATIENT
Start: 2019-03-25 | End: 2019-03-27 | Stop reason: HOSPADM

## 2019-03-25 RX ORDER — ACETAMINOPHEN 325 MG/1
650 TABLET ORAL EVERY 4 HOURS PRN
Status: DISCONTINUED | OUTPATIENT
Start: 2019-03-25 | End: 2019-03-25

## 2019-03-25 RX ORDER — ATENOLOL 50 MG/1
100 TABLET ORAL DAILY
COMMUNITY

## 2019-03-25 RX ORDER — POLYETHYLENE GLYCOL 3350 17 G/17G
17 POWDER, FOR SOLUTION ORAL DAILY PRN
Status: DISCONTINUED | OUTPATIENT
Start: 2019-03-25 | End: 2019-03-27 | Stop reason: HOSPADM

## 2019-03-25 RX ORDER — TRAMADOL HYDROCHLORIDE 50 MG/1
50 TABLET ORAL EVERY 6 HOURS PRN
Status: DISCONTINUED | OUTPATIENT
Start: 2019-03-25 | End: 2019-03-27 | Stop reason: HOSPADM

## 2019-03-25 RX ORDER — ATENOLOL 50 MG/1
50 TABLET ORAL DAILY
Status: DISCONTINUED | OUTPATIENT
Start: 2019-03-25 | End: 2019-03-25

## 2019-03-25 RX ORDER — ATENOLOL 50 MG/1
100 TABLET ORAL DAILY
Status: DISCONTINUED | OUTPATIENT
Start: 2019-03-25 | End: 2019-03-27 | Stop reason: HOSPADM

## 2019-03-25 RX ORDER — BISACODYL 5 MG
15 TABLET, DELAYED RELEASE (ENTERIC COATED) ORAL DAILY PRN
Status: DISCONTINUED | OUTPATIENT
Start: 2019-03-25 | End: 2019-03-27 | Stop reason: HOSPADM

## 2019-03-25 RX ORDER — AMOXICILLIN 250 MG
2 CAPSULE ORAL 2 TIMES DAILY
Status: DISCONTINUED | OUTPATIENT
Start: 2019-03-25 | End: 2019-03-27 | Stop reason: HOSPADM

## 2019-03-25 RX ORDER — METHOCARBAMOL 500 MG/1
500 TABLET, FILM COATED ORAL 4 TIMES DAILY PRN
Status: DISCONTINUED | OUTPATIENT
Start: 2019-03-25 | End: 2019-03-27 | Stop reason: HOSPADM

## 2019-03-25 RX ORDER — FENTANYL CITRATE 50 UG/ML
25 INJECTION, SOLUTION INTRAMUSCULAR; INTRAVENOUS ONCE
Status: COMPLETED | OUTPATIENT
Start: 2019-03-25 | End: 2019-03-25

## 2019-03-25 RX ORDER — ACETAMINOPHEN 500 MG
500-1000 TABLET ORAL EVERY 6 HOURS PRN
COMMUNITY

## 2019-03-25 RX ORDER — FOLIC ACID 1 MG/1
1 TABLET ORAL DAILY
Status: DISCONTINUED | OUTPATIENT
Start: 2019-03-25 | End: 2019-03-27 | Stop reason: HOSPADM

## 2019-03-25 RX ORDER — BISACODYL 5 MG
5 TABLET, DELAYED RELEASE (ENTERIC COATED) ORAL DAILY PRN
Status: DISCONTINUED | OUTPATIENT
Start: 2019-03-25 | End: 2019-03-27 | Stop reason: HOSPADM

## 2019-03-25 RX ORDER — IOPAMIDOL 755 MG/ML
80 INJECTION, SOLUTION INTRAVASCULAR ONCE
Status: COMPLETED | OUTPATIENT
Start: 2019-03-25 | End: 2019-03-25

## 2019-03-25 RX ADMIN — TIZANIDINE 2 MG: 2 TABLET ORAL at 05:33

## 2019-03-25 RX ADMIN — DICLOFENAC 4 G: 10 GEL TOPICAL at 09:28

## 2019-03-25 RX ADMIN — METHOCARBAMOL 500 MG: 500 TABLET, FILM COATED ORAL at 13:16

## 2019-03-25 RX ADMIN — FENTANYL CITRATE 25 MCG: 50 INJECTION, SOLUTION INTRAMUSCULAR; INTRAVENOUS at 00:53

## 2019-03-25 RX ADMIN — DICLOFENAC 4 G: 10 GEL TOPICAL at 22:04

## 2019-03-25 RX ADMIN — MORPHINE SULFATE 2 MG: 4 INJECTION INTRAVENOUS at 02:18

## 2019-03-25 RX ADMIN — HYDROCHLOROTHIAZIDE 12.5 MG: 12.5 CAPSULE ORAL at 09:26

## 2019-03-25 RX ADMIN — FOLIC ACID 1 MG: 1 TABLET ORAL at 09:25

## 2019-03-25 RX ADMIN — DICLOFENAC 4 G: 10 GEL TOPICAL at 18:45

## 2019-03-25 RX ADMIN — KETOROLAC TROMETHAMINE 15 MG: 15 INJECTION, SOLUTION INTRAMUSCULAR; INTRAVENOUS at 15:34

## 2019-03-25 RX ADMIN — ATENOLOL 100 MG: 50 TABLET ORAL at 09:25

## 2019-03-25 RX ADMIN — SENNOSIDES AND DOCUSATE SODIUM 2 TABLET: 8.6; 5 TABLET ORAL at 22:04

## 2019-03-25 RX ADMIN — IOPAMIDOL 80 ML: 755 INJECTION, SOLUTION INTRAVENOUS at 01:13

## 2019-03-25 RX ADMIN — ACETAMINOPHEN 975 MG: 325 TABLET, FILM COATED ORAL at 13:06

## 2019-03-25 RX ADMIN — ACETAMINOPHEN 975 MG: 325 TABLET, FILM COATED ORAL at 09:25

## 2019-03-25 RX ADMIN — ACETAMINOPHEN 975 MG: 325 TABLET, FILM COATED ORAL at 18:44

## 2019-03-25 RX ADMIN — DICLOFENAC 4 G: 10 GEL TOPICAL at 13:08

## 2019-03-25 RX ADMIN — SODIUM CHLORIDE 70 ML: 9 INJECTION, SOLUTION INTRAVENOUS at 01:13

## 2019-03-25 RX ADMIN — ACETAMINOPHEN 650 MG: 325 TABLET, FILM COATED ORAL at 02:15

## 2019-03-25 RX ADMIN — LIDOCAINE 2 PATCH: 560 PATCH PERCUTANEOUS; TOPICAL; TRANSDERMAL at 02:29

## 2019-03-25 RX ADMIN — SENNOSIDES AND DOCUSATE SODIUM 1 TABLET: 8.6; 5 TABLET ORAL at 09:26

## 2019-03-25 ASSESSMENT — ENCOUNTER SYMPTOMS
HEADACHES: 0
NAUSEA: 0
FEVER: 0
HEMATURIA: 0
DYSURIA: 0
BACK PAIN: 1

## 2019-03-25 ASSESSMENT — MIFFLIN-ST. JEOR: SCORE: 1316

## 2019-03-25 NOTE — PROGRESS NOTES
Non billing encounter     Nan Sanford is a 79 year old female hypertension, PMR (off steroids), IBS presented on 3/24/2019 due to acute back pain.  Has been admitted earlier this morning by my partner Yudi Machuca MD.  Please refer to H&P for details.    Patient lying in bed, states no pain on lying on her back, has 9/10 back pain on minimal ambulation or moving in bed.  No incontinence of bowel or bladder.  No new tingling or numbness.    Physical exam nonrevealing.  Strength 5/5 in all extremities.    Lab studies and CT imaging results reviewed.    Impression.  Acute low back pain/spasm likely musculoskeletal.  Hypertension.  History of osteoporosis.  Polymyalgia rheumatica.  Obesity with a BMI of 32.20.    None.  Continue to monitor under observation unit.  Warm compress.  Scheduled Tylenol.  PRN tramadol for pain.  We will check CPK, ESR, CRP.  Orthopedic team consulted as patient barely able to ambulate.  PT assessment requested.  If any change in symptoms/neurological symptoms will consider MRI imaging.  Patient, her family by the bedside, RN and interdisciplinary team involved in care and agree with plan.  Total time greater than 25 minutes.

## 2019-03-25 NOTE — PLAN OF CARE
PT:  Discharge Planner PT   Patient plan for discharge: Return home to condo  Current status: Orders received, eval completed, treatment initiated. Pt admitted under observation status with low back pain, R sided. Prior to admit pt was living alone in a condo, uses no AD and is independent with mobility and ADLs, drives independently. Currently requires min A for supine to sit, CGA sit to stand, CGA for gait of 10 ft with HHA and CGA for gait of 75 ft with FWW with pain rated 7/10. Educated on the importance of getting up to the chair and walking with nursing staff, using heat/ice and taking recommended pain meds to ease pain and stiffness. Pt demonstrates pain, dec strength, balance, activity tolerance and difficulty ambulating and transferring and would benefit from skilled PT services in order to improve this.  Barriers to return to prior living situation: Lives alone, high pain with movement, needs assist with bed mobility, is needing an AD for safe mobility  Recommendations for discharge: If pt were to discharge today, TCU would be recommended.  Rationale for recommendations: Pending improvement in pain, pt should be able to return home to her condo in the next day or so. Pt would benefit from continued PT to improve strength, balance, mobility to increase independence, reduce falls risk and increase safety before returning home.         Entered by: Mara Celaya 03/25/2019 3:46 PM

## 2019-03-25 NOTE — PHARMACY-ADMISSION MEDICATION HISTORY
Admission medication history interview status for the 3/24/2019  admission is complete. See EPIC admission navigator for prior to admission medications     Medication history source reliability:Good    Actions taken by pharmacist (provider contacted, etc): Messaged provider about changes in medication list.    Additional medication history information not noted on PTA med list :None    Medication reconciliation/reorder completed by provider prior to medication history? Yes    Time spent in this activity: 30 minutes    Prior to Admission medications    Medication Sig Last Dose Taking? Auth Provider   acetaminophen (TYLENOL) 500 MG tablet Take 500-1,000 mg by mouth every 6 hours as needed for mild pain 3/24/2019 at Unknown time Yes Unknown, Entered By History   alendronate (FOSAMAX) 70 MG tablet Take 70 mg by mouth every 7 days 3/23/2019 at Unknown Yes Unknown, Entered By History   atenolol (TENORMIN) 50 MG tablet Take 100 mg by mouth daily 3/24/2019 at Unknown time Yes Unknown, Entered By History   calcium-vitamin D (CALTRATE) 600-400 MG-UNIT per tablet Take 1 tablet by mouth daily 3/24/2019 at Unknown time Yes Unknown, Entered By History   Cholecalciferol (VITAMIN D3 PO) Take 5,000 Units by mouth daily 3/24/2019 at Unknown time Yes Unknown, Entered By History   folic acid (FOLVITE) 1 MG tablet Take 1 mg by mouth daily 3/24/2019 at Unknown time Yes Unknown, Entered By History   hydrochlorothiazide (MICROZIDE) 12.5 MG capsule Take 12.5 mg by mouth daily 3/24/2019 at Unknown time Yes Reported, Patient   methotrexate 2.5 MG tablet Take 7.5 mg by mouth every 7 days 3/22/2019 at Unknown Yes Unknown, Entered By History

## 2019-03-25 NOTE — H&P
[facility]    History and Physical - Hospitalist Service       Date of Admission:  3/24/2019    Assessment & Plan   Nan Sanford is a 79 year old female hypertension, PMR (off steroids), IBS who presented on 3/24/2019 due to acute back pain.  She has no red flag symptoms.  Describes mid to right lower back pain which feels like muscle spasms, and after receiving fentanyl, morphine, tizanidine, lidocaine patch and acetaminophen in the ED she has had no relief in pain and was unable to get up to return home.  CT of the abdomen and pelvis was done as patient was initially pointing to her abdomen although all of her pain is currently localized in the back, this showed no acute pathology.  I discussed it with the radiologist who reviewed the lumbar spine and right SI joint revealing no acute pathology, specifically no vertebral fractures.    Acute lower back pain - without radiculopathy, red flag symptoms. No recent trauma. Given this, and the fact symptoms have been less than 1 week, I have not yet ordered an MRI.  We are going to try conservative therapy with continuation of tizanidine, initiation of Voltaren gel, scheduled Tylenol.  Patient is appropriately hesitant to try Valium or further narcotics, realizing these are higher risk medications. However, I think we may need to escalate to this in order to break her cycle of pain and mobilize her. Physical therapy is ordered. Notably, patient was told to avoid NSAIDs while on methotrexate.     Hypertension   - Resume atenolol + hydrochlorothiazide.   - BP up likely secondary to pain.   - Hydralazine available for SBP > 180.     Osteoporosis - No vertebral fractures see on CT.   - Resume Fosamax, calcium and vitamin D at discharge.     PMR - Hast not been on prednisone for almost a year.   - Currently tapering down on methotrexate, awaiting med reconciliation.       Diet: Regular diet  DVT Prophylaxis: Low Risk/Ambulatory with no VTE prophylaxis  indicated if able to ambulate and discharge within the next 24 hours.   Perez Catheter: not present  Code Status: Full code, discussed at admission     Disposition Plan   Expected discharge: 24-48 hours, admitted under observation, recommended to TBD PT consulted, once able to ambulate or we have safe discharge plan.   Entered: Yudi Machuca MD 03/25/2019, 4:24 AM     The patient's care was discussed with the Patient.    Yudi Machuca MD  [facility]    ______________________________________________________________________    Chief Complaint   Back pain     History is obtained from the patient    History of Present Illness   Nan Sanford is a 79 year old female hypertension, PMR (off steroids), IBS who presented on 3/24/2019 due to acute back pain.  She has no red flag symptoms.  Describes mid to right lower back pain which feels like muscle spasms, and after receiving fentanyl, morphine, tizanidine, lidocaine patch and acetaminophen in the ED she has had no relief in pain and was unable to get up to return home.  CT of the abdomen and pelvis was done as patient was initially pointing to her abdomen although all of her pain is currently localized in the back, this showed no acute pathology.  I discussed it with the radiologist who reviewed the lumbar spine and right SI joint revealing no acute pathology, specifically no vertebral fractures.    Upon direct questioning, patient states she noticed pain in the right side of her back 1 week ago but it was more of nuisance than a real pain at that time.  She actually saw her rheumatologist last week and did not make much of it.  She thinks it may have come on after she was doing some spring cleaning and doing some more twisting motion with her body.  She denies any trauma.  The pain has become progressively worse to the point that after going to [place] this Sunday she was unable to move around her condo due to grabbing pain in her lower mid back  radiating towards her right buttock.  She denies any lower extremity symptoms with that she has had no numbness or weakness.  She is noted no loss of bowel or bladder function.  She denies any fever chills.  She has no known history of cancer. She denies any chest pain, headache, nausea, fever, dysuria, or hematuria. She received no medications en route but did take tylenol today.       Review of Systems    The 10 point Review of Systems is negative other than noted in the HPI.     Past Medical History    I have reviewed this patient's medical history and updated it with pertinent information if needed.   Past Medical History:   Diagnosis Date     Hypertension      Ovarian cyst     Ruptured     Polymyalgia rheumatica (H)    PMR - follows with Dr. Hernandez at [facility].   IBS  HTN  H/o ovarian cyst    Past Surgical History   I have reviewed this patient's surgical history and updated it with pertinent information if needed.  Past Surgical History:   Procedure Laterality Date     BREAST SURGERY       ENT SURGERY       GYN SURGERY       ORTHOPEDIC SURGERY         Social History   I have reviewed this patient's social history and updated it with pertinent information if needed.  Social History     Tobacco Use     Smoking status: Never Smoker   Substance Use Topics     Alcohol use: No     Drug use: No       Family History   Reviewed and non-contributory.     Prior to Admission Medications    Med reconciliation pending.  Patient reports she is taking atenolol, hydrochlorothiazide, Fosamax, folic acid, calcium plus vitamin D, methotrexate.      Allergies   No Known Allergies    Physical Exam   Vital Signs: Temp: 98.8  F (37.1  C) Temp src: Temporal BP: 186/75 Pulse: 71   Resp: 16 SpO2: 95 % O2 Device: None (Room air)    Weight: 180 lbs 0 oz    Constitutional:   awake, alert, cooperative, no apparent distress, and appears stated age     Eyes:   Lids and lashes normal, pupils equal, round and reactive to light,  extra ocular muscles intact, sclera clear, conjunctiva normal     ENT:   Normocephalic, without obvious abnormality, atramatic, oral pharynx with moist mucus membranes, tonsils without erythema or exudates .     Neck:   Supple, symmetrical, trachea midline, no adenopathy, thyroid symmetric, not enlarged and no tenderness, skin normal     Lungs:   No increased work of breathing, good air exchange, clear to auscultation bilaterally, no crackles or wheezing     Cardiovascular:   Regular rate and rhythm, normal S1 and S2, no S3 or S4, and no murmur noted. Extremities are warm. No edema.      Abdomen:   Normal bowel sounds, soft, non-distended, non-tender, no masses palpated, no hepatosplenomegally,      Musculoskeletal:   Central obesity. There is no redness, warmth, or swelling of the joints. Normal build. Pain localized to central back and R lower back with palpation.      Neurologic:   Awake, alert, oriented to name, place and time.  Cranial nerves II-XII are grossly intact.  Moving a 4 extremities without gross focal weakness.     Neuropsychiatric:   General: normal, calm and normal eye contact     Skin:   no bruising or bleeding, no redness, warmth, or swelling and no rashes       Data   Data reviewed today: I reviewed all medications, new labs and imaging results over the last 24 hours. I personally reviewed no images or EKG's today. Discussed CT results with radiologist.     Recent Labs   Lab 03/25/19  0042   WBC 7.3   HGB 13.1   MCV 98         POTASSIUM 4.4   CHLORIDE 108   CO2 27   BUN 29   CR 0.74   ANIONGAP 5   BRENNEN 8.4*   *   ALBUMIN 3.1*   PROTTOTAL 6.6*   BILITOTAL 0.1*   ALKPHOS 74   ALT 34   AST 35   LIPASE 228   TROPI <0.015     Recent Results (from the past 24 hour(s))   CTA Abdomen Pelvis with Contrast    Narrative    CTA ABDOMEN/PELVIS WITH CONTRAST   3/25/2019 1:18 AM     HISTORY: Back and flank and mid to right abdominal pain, hypertensive.    TECHNIQUE:  Arterial phase CT images  of the abdomen and pelvis  following nonionic intravenous contrast, 80 mL Isovue-370. Radiation  dose for this scan was reduced using automated exposure control,  adjustment of the mA and/or kV according to patient size, or iterative  reconstruction technique.    COMPARISON: 4/7/2017.    FINDINGS: The abdominal aorta is normal in caliber. There is mild  aortic atherosclerosis. The visceral branches of the abdominal aorta  are patent. There is a small accessory lower pole right renal artery.  No aortic dissection. Bilateral common, external, and internal iliac  arteries are patent. Bilateral common femoral arteries are patent.    The liver, gallbladder, spleen, pancreas, adrenal glands, and kidneys  demonstrate no worrisome focal abnormality. No abdominal or  retroperitoneal lymphadenopathy. No bowel obstruction, free air, or  ascites. No abdominal or retroperitoneal lymphadenopathy. No pelvic  adenopathy, free fluid, or mass. There are degenerative changes of the  lumbosacral spine without acute abnormality.      Impression    IMPRESSION: No acute abnormality.    MARTINEZ DYSON MD

## 2019-03-25 NOTE — PROGRESS NOTES
SW:  D: Consult acknowledged, awaiting therapy recommendations for discharge.     P: AMBER will follow for discharge planning.     MARTÍN Persaud

## 2019-03-25 NOTE — CONSULTS
[facility]    Orthopedic Consultation    Nan Sanford MRN# [id number]   Age: 79 year old YOB: 1940     Date of Admission:  3/24/2019    Reason for consult: Back pain       Requesting physician: Dr. Eva Corley       Level of consult: Consult, follow and place orders           Assessment and Plan:   Assessment:   Low back spasms  Rule out compression fracture   Chronic prednisone use       Plan:   Will obtain thoracic and lumbar x-rays for further evaluation   Added robaxin for control of muscle spasms  Added toradol to help with pain (x4 doses)  WBAT with walker  PT            Chief Complaint:   Low back pain         History of Present Illness:   This patient is a 79 year old female who presents with the following condition requiring a hospital admission:    Mrs. Sanford states that she has had right low back pain/muscle spasms from waistline to mid right glute. She has been having increasing severity and frequency over the last week. She has not had any traumatic events. No increased activity recently to account for this as well. She denies any right leg radiculopathy. No recent illness, fevers, chills, nausea, vomiting or malaise. She has not had any recent procedures. No recent travel. She denies any loss of bowl, bladder or saddle anesthesias. Patient has been on chronic steroids and has recently tapered off - due to PMR, has been off for a year. She currently takes methotrexate.   She has been diagnosed with osteoporosis.           Past Medical History:     Past Medical History:   Diagnosis Date     Hypertension      Ovarian cyst     Ruptured     Polymyalgia rheumatica (H)              Past Surgical History:     Past Surgical History:   Procedure Laterality Date     BREAST SURGERY       ENT SURGERY       GYN SURGERY       ORTHOPEDIC SURGERY               Social History:     Social History     Tobacco Use     Smoking status: Never Smoker   Substance Use Topics     Alcohol use:  No             Family History:   No family history on file.          Immunizations:     VACCINE/DOSE   Diptheria   DPT   DTAP   HBIG   Hepatitis A   Hepatitis B   HIB   Influenza   Measles   Meningococcal   MMR   Mumps   Pneumococcal   Polio   Rubella   Small Pox   TDAP   Varicella   Zoster             Allergies:   No Known Allergies          Medications:     Current Facility-Administered Medications   Medication     acetaminophen (TYLENOL) tablet 975 mg     atenolol (TENORMIN) tablet 100 mg     bisacodyl (DULCOLAX) EC tablet 5 mg    Or     bisacodyl (DULCOLAX) EC tablet 10 mg    Or     bisacodyl (DULCOLAX) EC tablet 15 mg     diclofenac (VOLTAREN) 1 % topical gel 4 g     folic acid (FOLVITE) tablet 1 mg     hydrALAZINE (APRESOLINE) tablet 10 mg     hydrochlorothiazide (MICROZIDE) capsule 12.5 mg     ketorolac (TORADOL) injection 15 mg     magnesium citrate solution 148 mL     melatonin tablet 1 mg     methocarbamol (ROBAXIN) tablet 500 mg     naloxone (NARCAN) injection 0.1-0.4 mg     ondansetron (ZOFRAN-ODT) ODT tab 4 mg    Or     ondansetron (ZOFRAN) injection 4 mg     polyethylene glycol (MIRALAX/GLYCOLAX) Packet 17 g     senna-docusate (SENOKOT-S/PERICOLACE) 8.6-50 MG per tablet 1 tablet    Or     senna-docusate (SENOKOT-S/PERICOLACE) 8.6-50 MG per tablet 2 tablet     tiZANidine (ZANAFLEX) tablet 2 mg     traMADol (ULTRAM) tablet 50 mg             Review of Systems:   CV: NEGATIVE for chest pain, palpitations or peripheral edema  C: NEGATIVE for fever, chills, change in weight  E/M: NEGATIVE for ear, mouth and throat problems  R: NEGATIVE for significant cough or SOB          Physical Exam:   All vitals have been reviewed  Patient Vitals for the past 24 hrs:   BP Temp Temp src Pulse Heart Rate Resp SpO2 Height Weight   03/25/19 0745 144/66 97.6  F (36.4  C) Oral 60 60 16 97 % -- --   03/25/19 0509 166/74 98.5  F (36.9  C) Oral -- 59 16 97 % -- 85.1 kg (187 lb 9.8 oz)   03/25/19 0400 -- -- -- -- 59 -- 95 % -- --  "  03/25/19 0159 186/75 -- -- 71 -- 16 95 % -- --   03/25/19 0045 197/69 -- -- 67 -- 16 96 % -- --   03/24/19 2349 200/77 98.8  F (37.1  C) Temporal 68 -- 18 98 % 1.626 m (5' 4\") 81.6 kg (180 lb)     No intake or output data in the 24 hours ending 03/25/19 1214    Patient laying comfortably in bed   No ecchymosis or erythema over entirety of the right leg  No notable swelling or edema  Full ROM of left knee - 0 to 100 degrees limited by position in bed  Negative log roll  Able to SLR  Hip flexes to 100 degrees  Internal rotation 30 degrees, External rotation 15 degrees  No pain with internal/external rotation while in flexion  No TTP over great trochanter  Bilateral calves are soft, non-tender.  Bilateral lower extremity is NVI.  Sensation intact bilateral lower extremities  5/5 motor with resisted dorsi and plantar flexion bilaterally  5/5 hip flexors  5/5 EHL  +Dp pulse          Data:   All laboratory data reviewed  Results for orders placed or performed during the hospital encounter of 03/24/19   CTA Abdomen Pelvis with Contrast    Narrative    CTA ABDOMEN/PELVIS WITH CONTRAST   3/25/2019 1:18 AM     HISTORY: Back and flank and mid to right abdominal pain, hypertensive.    TECHNIQUE:  Arterial phase CT images of the abdomen and pelvis  following nonionic intravenous contrast, 80 mL Isovue-370. Radiation  dose for this scan was reduced using automated exposure control,  adjustment of the mA and/or kV according to patient size, or iterative  reconstruction technique.    COMPARISON: 4/7/2017.    FINDINGS: The abdominal aorta is normal in caliber. There is mild  aortic atherosclerosis. The visceral branches of the abdominal aorta  are patent. There is a small accessory lower pole right renal artery.  No aortic dissection. Bilateral common, external, and internal iliac  arteries are patent. Bilateral common femoral arteries are patent.    The liver, gallbladder, spleen, pancreas, adrenal glands, and " kidneys  demonstrate no worrisome focal abnormality. No abdominal or  retroperitoneal lymphadenopathy. No bowel obstruction, free air, or  ascites. No abdominal or retroperitoneal lymphadenopathy. No pelvic  adenopathy, free fluid, or mass. There are degenerative changes of the  lumbosacral spine without acute abnormality.      Impression    IMPRESSION: No acute abnormality.    MARTINEZ DYSON MD   CBC with platelets differential   Result Value Ref Range    WBC 7.3 4.0 - 11.0 10e9/L    RBC Count 3.98 3.8 - 5.2 10e12/L    Hemoglobin 13.1 11.7 - 15.7 g/dL    Hematocrit 38.8 35.0 - 47.0 %    MCV 98 78 - 100 fl    MCH 32.9 26.5 - 33.0 pg    MCHC 33.8 31.5 - 36.5 g/dL    RDW 12.9 10.0 - 15.0 %    Platelet Count 203 150 - 450 10e9/L    Diff Method Automated Method     % Neutrophils 65.6 %    % Lymphocytes 23.4 %    % Monocytes 9.5 %    % Eosinophils 1.0 %    % Basophils 0.4 %    % Immature Granulocytes 0.1 %    Absolute Neutrophil 4.8 1.6 - 8.3 10e9/L    Absolute Lymphocytes 1.7 0.8 - 5.3 10e9/L    Absolute Monocytes 0.7 0.0 - 1.3 10e9/L    Absolute Eosinophils 0.1 0.0 - 0.7 10e9/L    Absolute Basophils 0.0 0.0 - 0.2 10e9/L    Abs Immature Granulocytes 0.0 0 - 0.4 10e9/L   Comprehensive metabolic panel   Result Value Ref Range    Sodium 140 133 - 144 mmol/L    Potassium 4.4 3.4 - 5.3 mmol/L    Chloride 108 94 - 109 mmol/L    Carbon Dioxide 27 20 - 32 mmol/L    Anion Gap 5 3 - 14 mmol/L    Glucose 109 (H) 70 - 99 mg/dL    Urea Nitrogen 29 7 - 30 mg/dL    Creatinine 0.74 0.52 - 1.04 mg/dL    GFR Estimate 77 >60 mL/min/[1.73_m2]    GFR Estimate If Black 90 >60 mL/min/[1.73_m2]    Calcium 8.4 (L) 8.5 - 10.1 mg/dL    Bilirubin Total 0.1 (L) 0.2 - 1.3 mg/dL    Albumin 3.1 (L) 3.4 - 5.0 g/dL    Protein Total 6.6 (L) 6.8 - 8.8 g/dL    Alkaline Phosphatase 74 40 - 150 U/L    ALT 34 0 - 50 U/L    AST 35 0 - 45 U/L   Lipase   Result Value Ref Range    Lipase 228 73 - 393 U/L   Troponin I   Result Value Ref Range    Troponin I ES  <0.015 0.000 - 0.045 ug/L   Creatinine POCT   Result Value Ref Range    Creatinine 0.7 0.52 - 1.04 mg/dL    GFR Estimate 81 >60 mL/min/[1.73_m2]    GFR Estimate If Black >90 >60 mL/min/[1.73_m2]   Erythrocyte sedimentation rate auto   Result Value Ref Range    Sed Rate 16 0 - 30 mm/h   CK total   Result Value Ref Range    CK Total 79 30 - 225 U/L   CRP inflammation   Result Value Ref Range    CRP Inflammation 7.7 0.0 - 8.0 mg/L          Attestation:  I have reviewed today's vital signs, notes, medications, labs and imaging with Dr. Campbell.  Amount of time performed on this consult: 20 minutes.    Yani Farmer PA-C

## 2019-03-25 NOTE — PLAN OF CARE
PT arrived from the ED via cart, this morning @530 am. Pt a history of hypertension and polymyalgia rheumatica who presents with back pain. Vss on RA cms intact neuros stable belongings set in the room, A&O stable diagnostic tests and consults completed and resulted   -vital signs normal or at patient baseline -not met  -able to get up and ambulate or has safe discharge plan to TCU -not met  Nurse to notify provider when observation goals have been met and patient is ready for discharge

## 2019-03-25 NOTE — PLAN OF CARE
RECEIVING UNIT ED HANDOFF REVIEW    ED Nurse Handoff Report was reviewed by: Rachel Euceda on March 25, 2019 at 4:37 AM

## 2019-03-25 NOTE — PLAN OF CARE
Care Plan Summary Note:  ORIENTATION/BEHAVIOR: alert and oriented and pleasant  ABNL VS/O2:  MOBILITY/FALL RISK:up with assistance of 1  PAIN MANAGMENT: using heat packs and muscle relaxants  DIET:Regular  BOWEL/BLADDER: continent   ABNL LAB/BG:  DRAIN/DEVICES:  SKIN: bruised   TESTS/PROCEDURES:will have xrays  AGGRESSION TOOL COLOR: green  D/C DAY/GOALS/PLACE:possibly tomorrow  OTHER:

## 2019-03-25 NOTE — PLAN OF CARE
Admission    Patient arrives to room  via cart from yes.  Care plan note: yes    Inpatient nursing criteria listed below were met: no obs pt     PCD's Documented: NA  Skin issues/needs documented :Yes  Isolation education started/completed NA  Patient allergies verified with patient: Yes  Verified completion of [place] Risk Assessment Tool:  Yes  Verified completion of Guardianship screening tool: Yes  Fall Prevention: Care plan updated, Education given and documented Yes  Care Plan initiated: No  Home medications documented in belongings flowsheet: NA  Patient belongings documented in belongings flowsheet: Yes  Reminder note (belongings/ medications) placed in discharge instructions:Yes  Admission profile/ required documentation complete: No

## 2019-03-25 NOTE — PROGRESS NOTES
03/25/19 1504   Quick Adds   Type of Visit Initial PT Evaluation   Living Environment   Lives With alone   Living Arrangements condominium   Home Accessibility no concerns   Self-Care   Usual Activity Tolerance moderate   Current Activity Tolerance poor   Regular Exercise No   Equipment Currently Used at Home none   Functional Level Prior   Ambulation 0-->independent   Transferring 0-->independent   Fall history within last six months no   Which of the above functional risks had a recent onset or change? none   General Information   Onset of Illness/Injury or Date of Surgery - Date 03/25/19   Referring Physician Yudi Machuca MD   Patient/Family Goals Statement return home   Pertinent History of Current Problem (include personal factors and/or comorbidities that impact the POC) Admitted under observation status with low back pain, right sided and in the upper bottocks. PMH: HTN, osteoporosis, PMR.   Precautions/Limitations fall precautions   Weight-Bearing Status - LLE full weight-bearing   Weight-Bearing Status - RLE full weight-bearing   General Observations son present   Cognitive Status Examination   Orientation orientation to person, place and time   Level of Consciousness alert   Follows Commands and Answers Questions 100% of the time;able to follow multistep instructions   Personal Safety and Judgment intact   Memory intact   Pain Assessment   Patient Currently in Pain Yes, see Vital Sign flowsheet  (2/10 at rest, 7/10 with gait)   Posture    Posture Forward head position;Protracted shoulders   Range of Motion (ROM)   ROM Quick Adds No deficits were identified   Strength   Strength Comments B hip flex NT due to back pain, knee ext 4+/5, DF 5/5   Bed Mobility   Bed Mobility Comments Supine to sit with min A and use of rail   Transfer Skills   Transfer Comments Sit to stand with CGA   Gait   Gait Comments Pt amb 10 ft with HHA and CGA   Balance   Balance Comments Balance dec with gait due to pain, pt  "reaching out for things to steady herself   Sensory Examination   Sensory Perception Comments Denies any numbness or tinlging   General Therapy Interventions   Planned Therapy Interventions bed mobility training;gait training;transfer training   Clinical Impression   Criteria for Skilled Therapeutic Intervention yes, treatment indicated   PT Diagnosis Difficulty ambulating   Influenced by the following impairments Dec strength, balance, activity tolerance, pain   Functional limitations due to impairments Difficulty ambulating and transferring   Clinical Presentation Stable/Uncomplicated   Clinical Presentation Rationale medically stable   Clinical Decision Making (Complexity) Low complexity   Therapy Frequency` 3 times/week   Predicted Duration of Therapy Intervention (days/wks) 1 week   Anticipated Discharge Disposition Home with Outpatient Therapy;Transitional Care Facility   Risk & Benefits of therapy have been explained Yes   Patient, Family & other staff in agreement with plan of care Yes   [facility] [employer] \"6 Clicks\"   2016, Trustees of [facility], under license to [employer].  All rights reserved.   6 Clicks Short Forms Basic Mobility Inpatient Short Form   [facility] AM-PAC  \"6 Clicks\" V.2 Basic Mobility Inpatient Short Form   1. Turning from your back to your side while in a flat bed without using bedrails? 3 - A Little   2. Moving from lying on your back to sitting on the side of a flat bed without using bedrails? 3 - A Little   3. Moving to and from a bed to a chair (including a wheelchair)? 3 - A Little   4. Standing up from a chair using your arms (e.g., wheelchair, or bedside chair)? 3 - A Little   5. To walk in hospital room? 3 - A Little   6. Climbing 3-5 steps with a railing? 3 - A Little   Basic Mobility Raw Score (Score out of 24.Lower scores equate to lower levels of function) 18   Total Evaluation Time   Total Evaluation Time (Minutes) 15     "

## 2019-03-25 NOTE — ED NOTES
"[facility]  ED Nurse Handoff Report    ED Chief complaint: Back Pain (Severe right lower back pain with spasms. Has intensified to \"10/10\" tonight. First noticed issue last Tuesday. Hypertensive per EMS.)      ED Diagnosis:   Final diagnoses:   Acute right-sided low back pain with left-sided sciatica   Right sided abdominal pain   Elevated blood pressure reading   Methotrexate, long term, current use       Code Status: Full Code    Allergies: No Known Allergies    Activity level - Baseline/Home:  Independent    Activity Level - Current:   Stand with Assist of 2     Needed?: No    Isolation: No  Infection: Not Applicable  Bariatric?: No    Vital Signs:   Vitals:    03/24/19 2349 03/25/19 0045 03/25/19 0159   BP: 200/77 197/69 186/75   Pulse: 68 67 71   Resp: 18 16 16   Temp: 98.8  F (37.1  C)     TempSrc: Temporal     SpO2: 98% 96% 95%   Weight: 81.6 kg (180 lb)     Height: 1.626 m (5' 4\")         Cardiac Rhythm: ,        Pain level: 0-10 Pain Scale: 4    Is this patient confused?: No   Does this patient have a guardian?  No         If yes, is there guardianship documents in the Epic \"Code/ACP\" activity?  N/A         Guardian Notified?  N/A  [place] - Suicide Severity Rating Scale Completed?  Yes  If yes, what color did the patient score?  White    Patient Report: Initial Complaint: Back pain  Focused Assessment:   Cardiac WDL  Neuro WDL  Resp WDL  Musculoskeletal R lower back pain with spasms starting tonight  Tests Performed:   Results for orders placed or performed during the hospital encounter of 03/24/19   CTA Abdomen Pelvis with Contrast    Narrative    CTA ABDOMEN/PELVIS WITH CONTRAST   3/25/2019 1:18 AM     HISTORY: Back and flank and mid to right abdominal pain, hypertensive.    TECHNIQUE:  Arterial phase CT images of the abdomen and pelvis  following nonionic intravenous contrast, 80 mL Isovue-370. Radiation  dose for this scan was reduced using automated exposure " control,  adjustment of the mA and/or kV according to patient size, or iterative  reconstruction technique.    COMPARISON: 4/7/2017.    FINDINGS: The abdominal aorta is normal in caliber. There is mild  aortic atherosclerosis. The visceral branches of the abdominal aorta  are patent. There is a small accessory lower pole right renal artery.  No aortic dissection. Bilateral common, external, and internal iliac  arteries are patent. Bilateral common femoral arteries are patent.    The liver, gallbladder, spleen, pancreas, adrenal glands, and kidneys  demonstrate no worrisome focal abnormality. No abdominal or  retroperitoneal lymphadenopathy. No bowel obstruction, free air, or  ascites. No abdominal or retroperitoneal lymphadenopathy. No pelvic  adenopathy, free fluid, or mass. There are degenerative changes of the  lumbosacral spine without acute abnormality.      Impression    IMPRESSION: No acute abnormality.    MARTINEZ DYSON MD   CBC with platelets differential   Result Value Ref Range    WBC 7.3 4.0 - 11.0 10e9/L    RBC Count 3.98 3.8 - 5.2 10e12/L    Hemoglobin 13.1 11.7 - 15.7 g/dL    Hematocrit 38.8 35.0 - 47.0 %    MCV 98 78 - 100 fl    MCH 32.9 26.5 - 33.0 pg    MCHC 33.8 31.5 - 36.5 g/dL    RDW 12.9 10.0 - 15.0 %    Platelet Count 203 150 - 450 10e9/L    Diff Method Automated Method     % Neutrophils 65.6 %    % Lymphocytes 23.4 %    % Monocytes 9.5 %    % Eosinophils 1.0 %    % Basophils 0.4 %    % Immature Granulocytes 0.1 %    Absolute Neutrophil 4.8 1.6 - 8.3 10e9/L    Absolute Lymphocytes 1.7 0.8 - 5.3 10e9/L    Absolute Monocytes 0.7 0.0 - 1.3 10e9/L    Absolute Eosinophils 0.1 0.0 - 0.7 10e9/L    Absolute Basophils 0.0 0.0 - 0.2 10e9/L    Abs Immature Granulocytes 0.0 0 - 0.4 10e9/L   Comprehensive metabolic panel   Result Value Ref Range    Sodium 140 133 - 144 mmol/L    Potassium 4.4 3.4 - 5.3 mmol/L    Chloride 108 94 - 109 mmol/L    Carbon Dioxide 27 20 - 32 mmol/L    Anion Gap 5 3 - 14 mmol/L     Glucose 109 (H) 70 - 99 mg/dL    Urea Nitrogen 29 7 - 30 mg/dL    Creatinine 0.74 0.52 - 1.04 mg/dL    GFR Estimate 77 >60 mL/min/[1.73_m2]    GFR Estimate If Black 90 >60 mL/min/[1.73_m2]    Calcium 8.4 (L) 8.5 - 10.1 mg/dL    Bilirubin Total 0.1 (L) 0.2 - 1.3 mg/dL    Albumin 3.1 (L) 3.4 - 5.0 g/dL    Protein Total 6.6 (L) 6.8 - 8.8 g/dL    Alkaline Phosphatase 74 40 - 150 U/L    ALT 34 0 - 50 U/L    AST 35 0 - 45 U/L   Lipase   Result Value Ref Range    Lipase 228 73 - 393 U/L   Troponin I   Result Value Ref Range    Troponin I ES <0.015 0.000 - 0.045 ug/L   Creatinine POCT   Result Value Ref Range    Creatinine 0.7 0.52 - 1.04 mg/dL    GFR Estimate 81 >60 mL/min/[1.73_m2]    GFR Estimate If Black >90 >60 mL/min/[1.73_m2]       Abnormal Results:   Abnormal Labs Reviewed   COMPREHENSIVE METABOLIC PANEL - Abnormal; Notable for the following components:       Result Value    Glucose 109 (*)     Calcium 8.4 (*)     Bilirubin Total 0.1 (*)     Albumin 3.1 (*)     Protein Total 6.6 (*)     All other components within normal limits       Treatments provided: Lidocaine patch, morphine IV 2mg, tylenol PO, Fentanyl IV 25mcg    Family Comments:      OBS brochure/video discussed/provided to patient/family: Yes              Name of person given brochure if not patient:                Relationship to patient:      ED Medications:   Medications   Lidocaine (LIDOCARE) 4 % Patch 2 patch (2 patches Transdermal Given 3/25/19 0229)     And   lidocaine patch REMOVAL (not administered)     And   lidocaine patch in PLACE (not administered)   tiZANidine (ZANAFLEX) tablet 2 mg (not administered)   fentaNYL (PF) (SUBLIMAZE) injection 25 mcg (25 mcg Intravenous Given 3/25/19 0053)   CT scan flush (70 mLs Intravenous Given 3/25/19 0113)   iopamidol (ISOVUE-370) solution 80 mL (80 mLs Intravenous Given 3/25/19 0113)   morphine (PF) injection 2 mg (2 mg Intravenous Given 3/25/19 0218)   acetaminophen (TYLENOL) tablet 650 mg (650 mg Oral  Given 3/25/19 0215)       Drips infusing?:  No    For the majority of the shift this patient was Green.   Interventions performed were  .    Severe Sepsis OR Septic Shock Diagnosis Present: No    To be done/followed up on inpatient unit:       ED NURSE PHONE NUMBER: *[phone]

## 2019-03-26 ENCOUNTER — APPOINTMENT (OUTPATIENT)
Dept: MRI IMAGING | Facility: CLINIC | Age: 79
End: 2019-03-26
Attending: PHYSICIAN ASSISTANT
Payer: MEDICARE

## 2019-03-26 ENCOUNTER — APPOINTMENT (OUTPATIENT)
Dept: PHYSICAL THERAPY | Facility: CLINIC | Age: 79
End: 2019-03-26
Attending: INTERNAL MEDICINE
Payer: MEDICARE

## 2019-03-26 PROCEDURE — 25000132 ZZH RX MED GY IP 250 OP 250 PS 637: Mod: GY | Performed by: PHYSICIAN ASSISTANT

## 2019-03-26 PROCEDURE — 25000128 H RX IP 250 OP 636: Performed by: PHYSICIAN ASSISTANT

## 2019-03-26 PROCEDURE — 99225 ZZC SUBSEQUENT OBSERVATION CARE,LEVEL II: CPT | Performed by: HOSPITALIST

## 2019-03-26 PROCEDURE — A9270 NON-COVERED ITEM OR SERVICE: HCPCS | Mod: GY | Performed by: HOSPITALIST

## 2019-03-26 PROCEDURE — G0378 HOSPITAL OBSERVATION PER HR: HCPCS

## 2019-03-26 PROCEDURE — 97116 GAIT TRAINING THERAPY: CPT | Mod: GP

## 2019-03-26 PROCEDURE — 25000132 ZZH RX MED GY IP 250 OP 250 PS 637: Mod: GY | Performed by: INTERNAL MEDICINE

## 2019-03-26 PROCEDURE — 25000132 ZZH RX MED GY IP 250 OP 250 PS 637: Mod: GY | Performed by: HOSPITALIST

## 2019-03-26 PROCEDURE — 72148 MRI LUMBAR SPINE W/O DYE: CPT

## 2019-03-26 PROCEDURE — 96376 TX/PRO/DX INJ SAME DRUG ADON: CPT | Mod: 59

## 2019-03-26 PROCEDURE — 99207 ZZC CDG-CODE CATEGORY CHANGED: CPT | Performed by: HOSPITALIST

## 2019-03-26 PROCEDURE — A9270 NON-COVERED ITEM OR SERVICE: HCPCS | Mod: GY | Performed by: PHYSICIAN ASSISTANT

## 2019-03-26 PROCEDURE — A9270 NON-COVERED ITEM OR SERVICE: HCPCS | Mod: GY | Performed by: INTERNAL MEDICINE

## 2019-03-26 RX ORDER — METHOCARBAMOL 500 MG/1
500 TABLET, FILM COATED ORAL 4 TIMES DAILY PRN
Status: DISCONTINUED | OUTPATIENT
Start: 2019-03-26 | End: 2019-03-26

## 2019-03-26 RX ADMIN — SENNOSIDES AND DOCUSATE SODIUM 2 TABLET: 8.6; 5 TABLET ORAL at 08:29

## 2019-03-26 RX ADMIN — DICLOFENAC 4 G: 10 GEL TOPICAL at 12:51

## 2019-03-26 RX ADMIN — DICLOFENAC 4 G: 10 GEL TOPICAL at 22:11

## 2019-03-26 RX ADMIN — ATENOLOL 100 MG: 50 TABLET ORAL at 08:28

## 2019-03-26 RX ADMIN — ACETAMINOPHEN 975 MG: 325 TABLET, FILM COATED ORAL at 07:24

## 2019-03-26 RX ADMIN — ACETAMINOPHEN 975 MG: 325 TABLET, FILM COATED ORAL at 18:18

## 2019-03-26 RX ADMIN — METHOCARBAMOL 500 MG: 500 TABLET, FILM COATED ORAL at 11:59

## 2019-03-26 RX ADMIN — METHOCARBAMOL 500 MG: 500 TABLET, FILM COATED ORAL at 18:19

## 2019-03-26 RX ADMIN — DICLOFENAC 4 G: 10 GEL TOPICAL at 18:19

## 2019-03-26 RX ADMIN — DICLOFENAC 4 G: 10 GEL TOPICAL at 09:31

## 2019-03-26 RX ADMIN — SENNOSIDES AND DOCUSATE SODIUM 2 TABLET: 8.6; 5 TABLET ORAL at 22:11

## 2019-03-26 RX ADMIN — HYDROCHLOROTHIAZIDE 12.5 MG: 12.5 CAPSULE ORAL at 08:28

## 2019-03-26 RX ADMIN — FOLIC ACID 1 MG: 1 TABLET ORAL at 08:28

## 2019-03-26 RX ADMIN — METHOCARBAMOL 500 MG: 500 TABLET, FILM COATED ORAL at 07:29

## 2019-03-26 RX ADMIN — ACETAMINOPHEN 975 MG: 325 TABLET, FILM COATED ORAL at 11:59

## 2019-03-26 RX ADMIN — KETOROLAC TROMETHAMINE 15 MG: 15 INJECTION, SOLUTION INTRAMUSCULAR; INTRAVENOUS at 16:15

## 2019-03-26 NOTE — PROGRESS NOTES
Reviewed lumbar and thoracic XR  No compression fractures  Scoliosis noted of lumbar spine and DDD    Patient would like to proceed with MRI to help order a possible lumbar spine cortisone injection    5/5 motor testing bilateral LE  Sensation grossly intact    MRI ordered  Continue toradol, robaxin, APAP, and Tramadol PRN    Yani Farmer PAC

## 2019-03-26 NOTE — PLAN OF CARE
Care Plan Summary Note:  ORIENTATION/BEHAVIOR: A&O x 4, calm and pleasant  ABNL VS/O2: VSS on RA  MOBILITY/FALL RISK: up x 1 assist to bathroom  PAIN MANAGMENT: managed with scheduled Tylenol, Voltren gel and  PRN Toradol with good result per pt.  DIET: Regular with good appetite  BOWEL/BLADDER: continent, voiding well, No BM during this shift. Given Senna.   ABNL LAB/BG: Total bilirubin 0.1  DRAIN/DEVICES: PIV saline lock.   SKIN: intact  TESTS/PROCEDURES: had X-ray done this shift.   AGGRESSION TOOL COLOR: Green  D/C DAY/GOALS/PLACE: Possible discharge tomorrow.   OTHER:

## 2019-03-26 NOTE — PROGRESS NOTES
Care Plan Summary Note:  ORIENTATION/BEHAVIOR: A/Ox4, calm, cooperative. Discouraged regarding having to be dependent on others for simple tasks  ABNL VS/O2: VSS. RA  MOBILITY/FALL RISK: Ax1  PAIN MANAGMENT: tylenol, toradol, tramadol, zanaflex  DIET: regular   BOWEL/BLADDER: up to bathroom, states she feels like she needs to have a BM but cannot  ABNL LAB/BG:   DRAIN/DEVICES:  SKIN:   TESTS/PROCEDURES:   AGGRESSION TOOL COLOR: Green   D/C DAY/GOALS/PLACE: Discharge today or tomorrow   OTHER: Pain when she ambulates; throughout the night she would ambulate slowly and the pain would spike once each time; heating packs utilized.

## 2019-03-26 NOTE — PROGRESS NOTES
[facility]  Hospitalist Progress Note   03/26/2019          Assessment and Plan:       Nan Sanford is a 79 year old female hypertension, PMR (off steroids), IBS presented on 3/24/2019 due to acute back pain.    Acute low back -likely from degenerative joint disease.  Presented with worsening low back pain for 1 week, no red flag symptoms.  On exam 5/5 motor testing bilateral lower extremity, sensation grossly intact.  Continued to have pain after receiving fentanyl, morphine, tizanidine, lidocaine patch and acetaminophen in the ED, unable to ambulate and hence admitted under observation status.  Sodium 140, creatinine 0.7, bilirubin 0.1.  AST 35, CPK 79, CRP 7.7, lipase 228.  Troponin undetectable.  WBC 7.3, platelets 203, ESR 16.  CT of the abdomen and pelvis [patient initially  pointing towards abdominal pain]  showed no acute pathology.  Hospitalist team had discussed with radiologist who reviewed the lumbar spine and right SI joint revealing no acute pathology, specifically no vertebral fractures.  X-ray lumbar/thoracic spine showed no compression fractures.  Scoliosis and degenerative joint disease of lumbar spine.  Orthopedic team consulted, plan to have MRI today with possible lumbar spine cortisone injection.  Appreciate also comanagement.  Continue scheduled Tylenol 9 7 5 mg 3 times a day.  Continue topical Voltaren gel 4 times a day.  PRN Robaxin 500 mg 4 times a day for muscle spasms.  Reserve tramadol 50 mg every 6 hours for moderate to severe pain.  PT ongoing.  Avoid NSAIDs while on methotrexate.  Bowel regimen with stool softeners and suppositories as needed while on muscle relaxant/narcotics     Hypertension   Continue PTA atenolol and hydrochlorothiazide.  PRN IV Hydralazine available for SBP > 180.      Osteoporosis  No vertebral fractures see on CT.   Resume Fosamax, calcium and vitamin D at discharge.      Polymyalgia rheumatica.  Hast not been on prednisone for almost a year.    Currently tapering down on methotrexate, hold while hospitalized.  Continue PTA folic acid supplements.    Obesity with a BMI of 32.20.  Consider lifestyle modification with diet and exercise as able to as outpatient.     Orders Placed This Encounter      Regular Diet Adult      DVT Prophylaxis: Sequential compression device, ambulate  Code Status: Full Code  Disposition: Expected discharge in today/tomorrow pending clinical improvement and orthopedic clearance    Patient, interdisciplinary team involved in care and agrees with plan.  Total time > 25 min [discussed with RN, care coordinator on the floor]. More than 50% of time spent in direct patient care, care coordination, patient counseling, and formalizing plan of care.     Eva Corley MD        Interval History:      Patient just had her MRI done, sitting up in the chair.  Appears comfortable.  States she had a Tylenol, Robaxin this morning which has helped with her pain.  No incontinence of bowel or bladder.  No new weakness.  No tingling or numbness.  Has not ambulated with therapy today.  Denies any chest pain or shortness of breath.  No headache or dizziness.  Afebrile.  Anxious about transition of care as patient lives alone.         Physical Exam:        Physical Exam   Temp:  [97.5  F (36.4  C)-97.9  F (36.6  C)] 97.5  F (36.4  C)  Pulse:  [52] 52  Heart Rate:  [57-70] 57  Resp:  [12-16] 16  BP: (130-162)/(65-82) 162/82  SpO2:  [95 %-97 %] 97 %    Intake/Output Summary (Last 24 hours) at 3/26/2019 0980  Last data filed at 3/25/2019 1817  Gross per 24 hour   Intake 520 ml   Output --   Net 520 ml       Admission Weight: 81.6 kg (180 lb)  Current Weight: 85.1 kg (187 lb 9.8 oz)    PHYSICAL EXAM  GENERAL: Patient is in no distress. Alert and oriented.  HEART: Regular rate and rhythm. S1S2. No murmurs  LUNGS: Clear to auscultation bilaterally. No expiratory wheeze.  ABDOMEN: Soft, no abdominal tenderness, bowel sounds heard   NEURO: Moving all  extremities, no focal weakness.  Sensory intact.  Paraspinal tenderness present.  EXTREMITIES: No pedal edema. 2+ peripheral pulses.  SKIN: Warm, dry.  PSYCHIATRY Cooperative       Medications:          acetaminophen  975 mg Oral TID     atenolol  100 mg Oral Daily     diclofenac  4 g Transdermal 4x Daily     folic acid  1 mg Oral Daily     hydrochlorothiazide  12.5 mg Oral Daily     senna-docusate  1 tablet Oral BID    Or     senna-docusate  2 tablet Oral BID     bisacodyl **OR** bisacodyl **OR** bisacodyl, hydrALAZINE, ketorolac, magnesium citrate, melatonin, methocarbamol, methocarbamol, naloxone, ondansetron **OR** ondansetron, polyethylene glycol, traMADol         Data:      All new lab and imaging data was reviewed.

## 2019-03-26 NOTE — PLAN OF CARE
Pt still having pain. Pt states pain is better with the scheduled tylenol and Robaxin. Pt up with assistance of 1. BP elevated and better after am meds.  States she feels she needs one more day. Had her MRI and waiting for ortho to come to see her with results.

## 2019-03-26 NOTE — PLAN OF CARE
PT:  Discharge Planner PT   Patient plan for discharge: Return home  Current status: Pt states she is feeling better today but still fairly painful with movement. Up in chair upon PT entry, required SBA for sit to stand with pain noted, SBA for gait of 200 ft with FWW with improved ability to ambulate today.  Barriers to return to prior living situation: Lives alone, needs assist to get out of bed  Recommendations for discharge: Anticipate pt will be able to return home pending pain improvement. May need family assist for a few days until she is independent.  Rationale for recommendations: Pt is moving better, but still with a fair amount of pain with movement. Would benefit from further therapy to improve mobility. Noted to have MRI and possible injection today. Will see how pt feels after this.       Entered by: Mara Celaya 03/26/2019 9:32 AM

## 2019-03-26 NOTE — PROGRESS NOTES
Discussed results of MRI with Dr. Castano  Will order L4-L5 RANADLL     Patient not currently anticoagulated  Prefers injection tomorrow     Will continue to follow    Yani RODRIGUEZ

## 2019-03-26 NOTE — CONSULTS
Care Transition Initial Assessment - SW     Met with: Patient    Active Problems:    Acute back pain       DATA  Lives With: alone   Living Arrangements: condominium  Quality of Family Relationships: supportive, involved  Description of Support System: Involved, Supportive  Who is your support system?: Children  Support Assessment: Adequate family and caregiver support.   Identified issues/concerns regarding health management: discharge planning.     Quality of Family Relationships: supportive, involved  Transportation Anticipated: family or friend will provide    ASSESSMENT  Cognitive Status:  awake, alert and oriented  Concerns to be addressed: Per SW consult for discharge planning. Pt admitted to OBS on 3/25/19 for acute back pain. discharge pending ortho clearance. Per PT pt should be able to return home pending pain improvement. SW met with pt who informs that she cannot afford TCU as she is OBS and it would be private pay. Pt would be open to receiving home care and would want [place]. Pt has 4 supportive sons and grand grandchildren as well. Pt informs that family will transport her at discharge.      PLAN  Financial costs for the patient includes: N/A  Patient given options and choices for discharge: Home   Patient/family is agreeable to the plan?  Yes  Patient Goals and Preferences: Home with home care   Patient anticipates discharging to:  Home with home care     MARTÍN Persaud

## 2019-03-26 NOTE — PROGRESS NOTES
SPIRITUAL HEALTH SERVICES Progress Note  [facility]    Met with pt after I checked in with her roommate.    Pt talked about her gm life, noting that she felt calm and peaceful during her MRI because she relaxed into prayer.    Pt has three supportive sons in the area, and a fourth who resides in [place].    Provided reflective listening, support and prayer.  SH team available if needs arise.                                                                                                                                                   Daria Maharaj M.A.  Staff   Pager [phone]  Phone [phone]

## 2019-03-27 ENCOUNTER — APPOINTMENT (OUTPATIENT)
Dept: GENERAL RADIOLOGY | Facility: CLINIC | Age: 79
End: 2019-03-27
Attending: PHYSICIAN ASSISTANT
Payer: MEDICARE

## 2019-03-27 ENCOUNTER — APPOINTMENT (OUTPATIENT)
Dept: PHYSICAL THERAPY | Facility: CLINIC | Age: 79
End: 2019-03-27
Payer: MEDICARE

## 2019-03-27 VITALS
DIASTOLIC BLOOD PRESSURE: 74 MMHG | WEIGHT: 187.61 LBS | HEART RATE: 57 BPM | TEMPERATURE: 97.7 F | RESPIRATION RATE: 16 BRPM | BODY MASS INDEX: 32.03 KG/M2 | SYSTOLIC BLOOD PRESSURE: 147 MMHG | OXYGEN SATURATION: 95 % | HEIGHT: 64 IN

## 2019-03-27 PROCEDURE — 25000128 H RX IP 250 OP 636: Performed by: PHYSICIAN ASSISTANT

## 2019-03-27 PROCEDURE — 27210202 XR TRANSLAMINAR EPIDURAL LUMBAR INJ INCL IMAGING

## 2019-03-27 PROCEDURE — 97116 GAIT TRAINING THERAPY: CPT | Mod: GP,XU

## 2019-03-27 PROCEDURE — 99217 ZZC OBSERVATION CARE DISCHARGE: CPT | Performed by: HOSPITALIST

## 2019-03-27 PROCEDURE — 25000125 ZZHC RX 250: Performed by: PHYSICIAN ASSISTANT

## 2019-03-27 PROCEDURE — 96376 TX/PRO/DX INJ SAME DRUG ADON: CPT

## 2019-03-27 PROCEDURE — 25000132 ZZH RX MED GY IP 250 OP 250 PS 637: Mod: GY | Performed by: INTERNAL MEDICINE

## 2019-03-27 PROCEDURE — 25000132 ZZH RX MED GY IP 250 OP 250 PS 637: Mod: GY | Performed by: HOSPITALIST

## 2019-03-27 PROCEDURE — 97530 THERAPEUTIC ACTIVITIES: CPT | Mod: GP

## 2019-03-27 PROCEDURE — G0378 HOSPITAL OBSERVATION PER HR: HCPCS

## 2019-03-27 PROCEDURE — 99207 ZZC CDG-CODE CATEGORY CHANGED: CPT | Performed by: HOSPITALIST

## 2019-03-27 PROCEDURE — 25500064 ZZH RX 255 OP 636: Performed by: PHYSICIAN ASSISTANT

## 2019-03-27 PROCEDURE — A9270 NON-COVERED ITEM OR SERVICE: HCPCS | Mod: GY | Performed by: HOSPITALIST

## 2019-03-27 PROCEDURE — A9270 NON-COVERED ITEM OR SERVICE: HCPCS | Mod: GY | Performed by: INTERNAL MEDICINE

## 2019-03-27 RX ORDER — DEXAMETHASONE SODIUM PHOSPHATE 10 MG/ML
20 INJECTION, SOLUTION INTRAMUSCULAR; INTRAVENOUS ONCE
Status: COMPLETED | OUTPATIENT
Start: 2019-03-27 | End: 2019-03-27

## 2019-03-27 RX ORDER — DEXTROSE MONOHYDRATE 25 G/50ML
25-50 INJECTION, SOLUTION INTRAVENOUS
Status: CANCELLED | OUTPATIENT
Start: 2019-03-27

## 2019-03-27 RX ORDER — LIDOCAINE HYDROCHLORIDE 10 MG/ML
30 INJECTION, SOLUTION EPIDURAL; INFILTRATION; INTRACAUDAL; PERINEURAL ONCE
Status: COMPLETED | OUTPATIENT
Start: 2019-03-27 | End: 2019-03-27

## 2019-03-27 RX ORDER — IOPAMIDOL 408 MG/ML
10 INJECTION, SOLUTION INTRATHECAL ONCE
Status: COMPLETED | OUTPATIENT
Start: 2019-03-27 | End: 2019-03-27

## 2019-03-27 RX ORDER — NICOTINE POLACRILEX 4 MG
15-30 LOZENGE BUCCAL
Status: CANCELLED | OUTPATIENT
Start: 2019-03-27

## 2019-03-27 RX ORDER — LIDOCAINE HYDROCHLORIDE 10 MG/ML
5 INJECTION, SOLUTION EPIDURAL; INFILTRATION; INTRACAUDAL; PERINEURAL ONCE
Status: COMPLETED | OUTPATIENT
Start: 2019-03-27 | End: 2019-03-27

## 2019-03-27 RX ADMIN — LIDOCAINE HYDROCHLORIDE 3 ML: 10 INJECTION, SOLUTION EPIDURAL; INFILTRATION; INTRACAUDAL; PERINEURAL at 08:15

## 2019-03-27 RX ADMIN — LIDOCAINE HYDROCHLORIDE 3 ML: 10 INJECTION, SOLUTION EPIDURAL; INFILTRATION; INTRACAUDAL; PERINEURAL at 08:10

## 2019-03-27 RX ADMIN — FOLIC ACID 1 MG: 1 TABLET ORAL at 08:49

## 2019-03-27 RX ADMIN — ATENOLOL 100 MG: 50 TABLET ORAL at 08:49

## 2019-03-27 RX ADMIN — ACETAMINOPHEN 975 MG: 325 TABLET, FILM COATED ORAL at 14:37

## 2019-03-27 RX ADMIN — DEXAMETHASONE SODIUM PHOSPHATE 20 MG: 10 INJECTION, SOLUTION INTRAMUSCULAR; INTRAVENOUS at 08:15

## 2019-03-27 RX ADMIN — IOPAMIDOL 1 ML: 408 INJECTION, SOLUTION INTRATHECAL at 08:12

## 2019-03-27 RX ADMIN — HYDROCHLOROTHIAZIDE 12.5 MG: 12.5 CAPSULE ORAL at 08:49

## 2019-03-27 RX ADMIN — ACETAMINOPHEN 975 MG: 325 TABLET, FILM COATED ORAL at 08:49

## 2019-03-27 RX ADMIN — KETOROLAC TROMETHAMINE 15 MG: 15 INJECTION, SOLUTION INTRAMUSCULAR; INTRAVENOUS at 01:15

## 2019-03-27 NOTE — PLAN OF CARE
Care Plan Summary Note:  ORIENTATION/BEHAVIOR: Pt is A&O x 4, calm and cooperative for cares.   ABNL VS/O2: VSS on RA, hypertensive at the beginning of the shift. Latest B/P 139/66.   MOBILITY/FALL RISK: up x 1 assist to bathroom, ambulated in the hole way once with staff.   PAIN MANAGMENT: managed with PRN Toradol IV, Robaxin and scheduled Tylenol & Voltaren gel.   DIET: Regular   BOWEL/BLADDER: Continent  ABNL LAB/BG:Multilevel degenerative change of lumbar spine.   DRAIN/DEVICES: PIV saline lock  SKIN:intact, bruises  TESTS/PROCEDURES: Lumbar spine cortisone injection tomorrow  AGGRESSION TOOL COLOR: White  D/C DAY/GOALS/PLACE: Pending ortho to clear him up.   OTHER:

## 2019-03-27 NOTE — PROGRESS NOTES
RADIOLOGY PROCEDURE NOTE  Patient name: Nan Sanford  MRN: [id number]  : 1940    Pre-procedure diagnosis: Back pain  Post-procedure diagnosis: Same    Procedure Date/Time: 2019  8:50 AM  Procedure: L4-L5 TLESI  Estimated blood loss: None  Specimen(s) collected with description: none  The patient tolerated the procedure well with no immediate complications.  Significant findings:none    See imaging dictation for procedural details.    Provider name: Jasmeet Clayton  Assistant(s):None

## 2019-03-27 NOTE — PLAN OF CARE
Pt A&O x4. VSS on RA, ex HTN. Pt received cortisone injection this morning, which was effective in improving pts pain. Pt also taking scheduled Tylenol. Pt tolerating regular diet. Ambulating with minimal SBA. Voiding adequately. Pt seen and cleared by PT, home PT/OT ordered. Pt given okay to discharge. Gave discharge instructions to pt, pt verbalized understanding. Pt discharged to home via wheelchair.

## 2019-03-27 NOTE — DISCHARGE SUMMARY
Discharge Summary  Hospitalist    Date of Admission:  3/24/2019  Date of Discharge:  3/27/2019  Discharging Provider: Eva Corley MD    Primary Care Physician   Jessica Menjivar  Primary Care Provider Phone Number: [phone]  Primary Care Provider Fax Number: [phone]    PRINCIPAL DIAGNOSIS  Acute low back -likely from degenerative joint disease.    Past Medical History:   Diagnosis Date     Hypertension      Ovarian cyst     Ruptured     Polymyalgia rheumatica (H)        History of Present Illness   Nan Sanford is an 79 year old female who presented with back pain    Hospital Course   Nan Sanford is a 79 year old female hypertension, PMR (off steroids), IBS presented on 3/24/2019 due to acute back pain.     Acute low back -likely from degenerative joint disease.  Presented with worsening low back pain for 1 week, no red flag symptoms.  On exam 5/5 motor testing bilateral lower extremity, sensation grossly intact.  Continued to have pain after receiving fentanyl, morphine, tizanidine, lidocaine patch and acetaminophen in the ED, unable to ambulate and hence admitted under observation status.  Sodium 140, creatinine 0.7, bilirubin 0.1.  AST 35, CPK 79, CRP 7.7, lipase 228.  Troponin undetectable.  WBC 7.3, platelets 203, ESR 16.  CT of the abdomen and pelvis [patient initially  pointing towards abdominal pain]  showed no acute pathology.  Hospitalist team had discussed with radiologist who reviewed the lumbar spine and right SI joint revealing no acute pathology, specifically no vertebral fractures.  X-ray lumbar/thoracic spine showed no compression fractures.  Scoliosis and degenerative joint disease of lumbar spine.  MRI degenerative joint disease.  Orthopedic team consulted, status post L4-L5 TL RANDALL.  Per patient tolerated well, symptoms improving.  Evaluated by PT, OT and recommended discharge home with home PT/OT.  Continue PRN Tylenol, warm compress.  PT, OT.  Avoid NSAIDs while on  methotrexate.     Hypertension   Continue PTA atenolol and hydrochlorothiazide.  PRN IV Hydralazine available for SBP > 180.      Osteoporosis  No vertebral fractures see on CT.   Resume Fosamax, calcium and vitamin D at discharge.      Polymyalgia rheumatica.  Hast not been on prednisone for almost a year.   Currently tapering down on methotrexate, hold while hospitalized.  Continue PTA folic acid supplements.     Obesity with a BMI of 32.20.  Consider lifestyle modification with diet and exercise as able to as outpatient.    Patient, RN, /care coordinator and interdisciplinary team involved in care and agree with plan.    Eva Corley MD    Pending Results   Unresulted Labs Ordered in the Past 30 Days of this Admission     No orders found from 1/23/2019 to 3/25/2019.             Physical Exam   Vitals:    03/24/19 2349 03/25/19 0509   Weight: 81.6 kg (180 lb) 85.1 kg (187 lb 9.8 oz)     Vital Signs with Ranges  Temp:  [97.7  F (36.5  C)-98.2  F (36.8  C)] 97.7  F (36.5  C)  Pulse:  [57-61] 57  Heart Rate:  [71] 71  Resp:  [16] 16  BP: (139-164)/(66-79) 147/74  SpO2:  [95 %-96 %] 95 %  No intake/output data recorded.  PHYSICAL EXAM  GENERAL: Patient is in no distress. Alert and oriented.  HEART: Regular rate and rhythm. S1S2. No murmurs  LUNGS: Clear to auscultation bilaterally. No expiratory wheeze.  ABDOMEN: Soft, no abdominal tenderness, bowel sounds heard   NEURO: Moving all extremities, no focal weakness.  Sensory intact.  Paraspinal tenderness present.  EXTREMITIES: No pedal edema. 2+ peripheral pulses.  SKIN: Warm, dry.  PSYCHIATRY Cooperative    )Consultations This Hospital Stay   PHYSICAL THERAPY ADULT IP CONSULT  SOCIAL WORK IP CONSULT  ORTHOPEDICS IP CONSULT    Time Spent on this Encounter   IEva, personally saw the patient today and spent less than or equal to 30 minutes discharging this patient.    Discharge Orders      Home Care PT Referral for Hospital Discharge       Home Care OT Referral for Hospital Discharge      Reason for your hospital stay    You were admitted to the hospital with acute back pain, treated conservatively with no improvement in symptoms.  Had MRI and underwent cortisone injection.     Follow-up and recommended labs and tests     Follow up with primary care provider, Jessica Mnejivar, within 7 days for hospital follow- up.  No follow up labs or test are needed if symptoms improve.  Follow-up with orthopedics team as needed for back pain.     Activity    Your activity upon discharge: activity as tolerated and no driving for today     Discharge Instructions    Consider lifestyle modification with diet and exercise as able to.     MD face to face encounter    Documentation of Face to Face and Certification for Home Health Services    I certify that patient: Nan Sanford is under my care and that I, or a nurse practitioner or physician's assistant working with me, had a face-to-face encounter that meets the physician face-to-face encounter requirements with this patient on: 3/27/2019.    This encounter with the patient was in whole, or in part, for the following medical condition, which is the primary reason for home health care: Acute back pain from DJD    I certify that, based on my findings, the following services are medically necessary home health services: Occupational Therapy and Physical Therapy.    My clinical findings support the need for the above services because: Occupational Therapy Services are needed to assess and treat cognitive ability and address ADL safety due to impairment in ambulation can treat her back pain  PT for home physical therapy to improve strength, balance, mobility to increase independence, reduce falls risk and increase safety.    Further, I certify that my clinical findings support that this patient is homebound (i.e. absences from home require considerable and taxing effort and are for medical reasons or [facility] services or  infrequently or of short duration when for other reasons) because: Requires assistance of another person or specialized equipment to access medical services because patient: Is unable to exit home safely on own due to: back pain..    Based on the above findings. I certify that this patient is confined to the home and needs intermittent skilled nursing care, physical therapy and/or speech therapy.  The patient is under my care, and I have initiated the establishment of the plan of care.  This patient will be followed by a physician who will periodically review the plan of care.  Physician/Provider to provide follow up care: Jessica Menjivar    Attending hospital physician (the Medicare certified [facility] provider): Eva Corley  Physician Signature: See electronic signature associated with these discharge orders.  Date: 3/27/2019     Diet    Consider low-salt, low-carb, low-fat diet.       Discharge Medications   Current Discharge Medication List      CONTINUE these medications which have NOT CHANGED    Details   acetaminophen (TYLENOL) 500 MG tablet Take 500-1,000 mg by mouth every 6 hours as needed for mild pain      alendronate (FOSAMAX) 70 MG tablet Take 70 mg by mouth every 7 days      atenolol (TENORMIN) 50 MG tablet Take 100 mg by mouth daily      calcium-vitamin D (CALTRATE) 600-400 MG-UNIT per tablet Take 1 tablet by mouth daily      Cholecalciferol (VITAMIN D3 PO) Take 5,000 Units by mouth daily      folic acid (FOLVITE) 1 MG tablet Take 1 mg by mouth daily      hydrochlorothiazide (MICROZIDE) 12.5 MG capsule Take 12.5 mg by mouth daily      methotrexate 2.5 MG tablet Take 7.5 mg by mouth every 7 days           Allergies   No Known Allergies    Discharge Disposition   Discharged to home  Condition at discharge: Stable    DATA  Most Recent 3 CBC's:  Recent Labs   Lab Test 03/25/19  0042 04/07/17  0300 11/26/15  0550   WBC 7.3 8.2 6.3   HGB 13.1 14.4 11.7   MCV 98 97 94    201 199      Most Recent 3  BMP's:  Recent Labs   Lab Test 03/25/19  0042 04/07/17  0300 11/26/15  0550    143 140   POTASSIUM 4.4 3.5 3.5   CHLORIDE 108 104 106   CO2 27 31 27   BUN 29 23 24   CR 0.74 0.63 0.75   ANIONGAP 5 8 7   BRENNEN 8.4* 8.9 8.2*   * 96 89     Most Recent 2 LFT's:  Recent Labs   Lab Test 03/25/19  0042 04/07/17  0300   AST 35 16   ALT 34 25   ALKPHOS 74 73   BILITOTAL 0.1* 0.5     Most Recent 3 Troponin's:  Recent Labs   Lab Test 03/25/19  0042 09/22/15  1204   TROPI <0.015 <0.015  The 99th percentile for upper reference range is 0.045 ug/L.  Troponin values in   the range of 0.045 - 0.120 ug/L may be associated with risks of adverse   clinical events.       Most Recent TSH, T4 and A1c Labs:  Recent Labs   Lab Test 11/25/15  0530   A1C 5.7     Results for orders placed or performed during the hospital encounter of 03/24/19   CTA Abdomen Pelvis with Contrast    Narrative    CTA ABDOMEN/PELVIS WITH CONTRAST   3/25/2019 1:18 AM     HISTORY: Back and flank and mid to right abdominal pain, hypertensive.    TECHNIQUE:  Arterial phase CT images of the abdomen and pelvis  following nonionic intravenous contrast, 80 mL Isovue-370. Radiation  dose for this scan was reduced using automated exposure control,  adjustment of the mA and/or kV according to patient size, or iterative  reconstruction technique.    COMPARISON: 4/7/2017.    FINDINGS: The abdominal aorta is normal in caliber. There is mild  aortic atherosclerosis. The visceral branches of the abdominal aorta  are patent. There is a small accessory lower pole right renal artery.  No aortic dissection. Bilateral common, external, and internal iliac  arteries are patent. Bilateral common femoral arteries are patent.    The liver, gallbladder, spleen, pancreas, adrenal glands, and kidneys  demonstrate no worrisome focal abnormality. No abdominal or  retroperitoneal lymphadenopathy. No bowel obstruction, free air, or  ascites. No abdominal or retroperitoneal  lymphadenopathy. No pelvic  adenopathy, free fluid, or mass. There are degenerative changes of the  lumbosacral spine without acute abnormality.      Impression    IMPRESSION: No acute abnormality.    MARTINEZ DYSON MD   XR Thoracic Spine 2 Views    Narrative    XR THORACIC SPINE TWO VIEWS  3/25/2019 6:14 PM     HISTORY: Back pain, evaluate for compression fractures.    COMPARISON: None.      Impression    IMPRESSION: Bones appear osteopenic which limits evaluation for  fractures. There is slightly exaggerated thoracic kyphosis of the  upper thoracic spine. No definite loss of vertebral body height. Mild  degenerative changes and loss of intervertebral disc space in the  upper thoracic spine.    CHERY HINSON MD   XR Lumbar Spine 2/3 Views    Narrative    LUMBAR SPINE TWO TO THREE VIEWS    3/25/2019 6:15 PM     HISTORY: Back pain, evaluate for compression fractures.    COMPARISON: 11/1/2015.      Impression    IMPRESSION: Bones appear osteopenic which limits evaluation for  fractures. There is convex left curvature of the lumbar spine centered  at L3. No definite loss of vertebral body height. Marked degenerative  changes and loss of intervertebral disc space throughout the lumbar  spine. Pronounced osteophytic spurring anteriorly at L3-4 and to a  lesser extent L2-3 and L1-2.     CHERY HINSON MD   MR Lumbar Spine w/o Contrast    Narrative    MRI LUMBAR SPINE WITHOUT CONTRAST March 26, 2019 10:55 AM     HISTORY: Degenerative joint disease.     TECHNIQUE: Multiplanar multisequence MRI of the lumbar spine without  contrast.     COMPARISON: Lumbar spine x-ray 3/25/2019.     FINDINGS: There are five lumbar-type vertebral bodies assumed for the  purposes of this dictation. The distal spinal cord and cauda equina  appear normal.     Convex left curvature of the lumbar spine centered at L3. There is 0.7  cm leftward listhesis of L4 on L5. Anterior posterior alignment of the  spine is within normal limits. No obvious  loss of vertebral body  height. No destructive osseous lesion. Severe loss of intervertebral  disc space and degenerative endplate changes at multiple levels in the  lumbar spine including throughout the L1-L2 endplate asymmetric on the  right at L2-L3 and asymmetric on the left at L4-L5 and L5-S1. There is  STIR hyperintense endplate edema throughout the L1-L2 endplates and  posteriorly at L3-L4.    Level by level as follows:     T12-L1: No significant spinal canal or neural foraminal narrowing.     L1-L2: Circumferential disc bulge and endplate osteophytic spurring.  There is mild bilateral neural foraminal narrowing. No spinal canal  narrowing.     L2-L3: Convex left curvature along with circumferential disc bulge and  endplate osteophytic spurring asymmetric towards the right. There is  moderate right neural foraminal narrowing. No spinal canal or left  neural foraminal narrowing.     L3-L4: Convex left curvature with circumferential disc bulge and  endplate osteophytic spurring asymmetric towards the right. There is  right greater than left facet hypertrophy. Moderate right neural  foraminal narrowing. No significant spinal canal or left neural  foraminal narrowing.     L4-L5: Leftward listhesis with circumferential disc bulge and endplate  osteophytic spurring and bilateral facet hypertrophy. There is  moderate bilateral neural foraminal narrowing. No significant central  spinal canal narrowing.    L5-S1: Circumferential disc bulge asymmetric towards the left  foraminal region along with left greater than right facet hypertrophy.  There is moderate left neural foraminal narrowing. No spinal canal or  right neural foraminal narrowing.     Paraspinous soft tissues: Normal.       Impression    IMPRESSION: Convex curvature of the lumbar spine as described above.  No obvious loss of vertebral body height or evidence of acute  fracture. Multilevel degenerative changes throughout the lumbar spine  as described above  with multiple levels of neural foraminal  narrowings. No significant spinal canal narrowing.     CHERY HINSON MD   XR Translaminar Epidural Lumbar Inj    Narrative    XR TRANSLAMINAR EPIDURAL LUMBAR INJ INCL IMAGING                 3/27/2019 8:22 AM       History:  L4-L5 RANDALL.     PROCEDURE:  The procedure, indications, risks (including the risk of  infection, bleeding and reaction to contrast material and  medications), and alternatives to therapy were discussed with the  patient and informed consent was obtained for the procedure.  The  lower back was prepped and draped in the usual fashion.  Lidocaine 1%  was used for local anesthesia.  Using fluoroscopic guidance, a  [place]  needle was advanced into the epidural space via interlaminar approach  at the L4-5 level.  A small amount of contrast was injected confirming  epidural location of the needle.  Subsequently, a mixture of 3 mL  Celestone and 3 mL Lidocaine 1% was injected.  The needle was removed.   Estimated blood loss during the procedure was less than 5 mL. No  specimens collected. The patient tolerated the procedure well and  there were no immediate complications.        Fluoro time: 0.1 minutes  Images Obtained: 4    Patient's pain levels (0-10 scale) are as follows:    PRE INJECTION     Low back                 2     Right leg                  2   Left leg                     0      POST INJECTION   Low back                 0    Right leg                  0    Left leg                     0         Impression    IMPRESSION:  Technically successful lumbar translaminar epidural  steroid injection at  L4-5.  Long-term results are pending.    BELLA ASHFORD PA-C

## 2019-03-27 NOTE — PLAN OF CARE
PT:  Discharge Planner PT   Patient plan for discharge: Return home today  Current status: Pt feeling better after her steroid injection this morning. Independent with bed mobility in and out, independent sit to stand with FWW, SBA progressing to mod I for gait of 200 ft with FWW with improved speed and posture. Pt used the bathroom independently. Encouraged continued activity at home and use of heat packs as needed and pain medications prescribed.  Barriers to return to prior living situation: None  Recommendations for discharge: Return home with home PT and OT  Rationale for recommendations: Pt would benefit from continued PT to improve strength, balance, mobility to increase independence, reduce falls risk and increase safety.       Entered by: Mara Celaya 03/27/2019 3:17 PM     Pt has met PT goals, will discharge from PT.    Physical Therapy Discharge Summary    Reason for therapy discharge:    All goals and outcomes met, no further needs identified.    Progress towards therapy goal(s). See goals on Care Plan in [facility] electronic health record for goal details.  Goals met    Therapy recommendation(s):    Continued therapy is recommended.  Rationale/Recommendations:  eval and treat with home PT and OT.

## 2019-03-27 NOTE — PLAN OF CARE
Care Plan Summary Note:  ORIENTATION/BEHAVIOR: A&O x 4  ABNL VS/O2: VSS on room air, BP can run high  MOBILITY/FALL RISK: Up with one   PAIN MANAGMENT: Toradol administered x 1  DIET: Tolerating Regular diet  BOWEL/BLADDER: No incontinence   ABNL LAB/BG:   DRAIN/DEVICES: PIV is saline locked  SKIN: Bruising  TESTS/PROCEDURES: Injection to lumbar spine today  AGGRESSION TOOL COLOR: Green  D/C DAY/GOALS/PLACE: Discharge to home today, pending clearance from ortho   OTHER:

## 2021-08-08 ENCOUNTER — APPOINTMENT (OUTPATIENT)
Dept: GENERAL RADIOLOGY | Facility: CLINIC | Age: 81
End: 2021-08-08
Attending: EMERGENCY MEDICINE
Payer: MEDICARE

## 2021-08-08 ENCOUNTER — APPOINTMENT (OUTPATIENT)
Dept: CT IMAGING | Facility: CLINIC | Age: 81
End: 2021-08-08
Attending: EMERGENCY MEDICINE
Payer: MEDICARE

## 2021-08-08 ENCOUNTER — HOSPITAL ENCOUNTER (EMERGENCY)
Facility: CLINIC | Age: 81
Discharge: HOME | End: 2021-08-08
Attending: EMERGENCY MEDICINE | Admitting: EMERGENCY MEDICINE
Payer: MEDICARE

## 2021-08-08 VITALS
TEMPERATURE: 98.2 F | OXYGEN SATURATION: 96 % | HEART RATE: 67 BPM | RESPIRATION RATE: 18 BRPM | DIASTOLIC BLOOD PRESSURE: 60 MMHG | SYSTOLIC BLOOD PRESSURE: 160 MMHG

## 2021-08-08 DIAGNOSIS — S09.90XA CLOSED HEAD INJURY, INITIAL ENCOUNTER: ICD-10-CM

## 2021-08-08 DIAGNOSIS — S63.501A WRIST SPRAIN, RIGHT, INITIAL ENCOUNTER: ICD-10-CM

## 2021-08-08 DIAGNOSIS — S01.81XA FACIAL LACERATION, INITIAL ENCOUNTER: ICD-10-CM

## 2021-08-08 PROCEDURE — 99285 EMERGENCY DEPT VISIT HI MDM: CPT | Mod: 25

## 2021-08-08 PROCEDURE — 250N000011 HC RX IP 250 OP 636: Performed by: EMERGENCY MEDICINE

## 2021-08-08 PROCEDURE — 70450 CT HEAD/BRAIN W/O DYE: CPT | Mod: MG

## 2021-08-08 PROCEDURE — 90715 TDAP VACCINE 7 YRS/> IM: CPT | Performed by: EMERGENCY MEDICINE

## 2021-08-08 PROCEDURE — 90471 IMMUNIZATION ADMIN: CPT | Performed by: EMERGENCY MEDICINE

## 2021-08-08 PROCEDURE — 73110 X-RAY EXAM OF WRIST: CPT | Mod: RT

## 2021-08-08 PROCEDURE — 250N000009 HC RX 250: Performed by: EMERGENCY MEDICINE

## 2021-08-08 PROCEDURE — 271N000002 HC RX 271: Performed by: EMERGENCY MEDICINE

## 2021-08-08 PROCEDURE — 12013 RPR F/E/E/N/L/M 2.6-5.0 CM: CPT

## 2021-08-08 RX ORDER — ONDANSETRON 4 MG/1
4 TABLET, ORALLY DISINTEGRATING ORAL EVERY 8 HOURS PRN
Qty: 9 TABLET | Refills: 0 | Status: SHIPPED | OUTPATIENT
Start: 2021-08-08 | End: 2021-08-11

## 2021-08-08 RX ORDER — METHYLCELLULOSE 4000CPS 30 %
POWDER (GRAM) MISCELLANEOUS ONCE
Status: COMPLETED | OUTPATIENT
Start: 2021-08-08 | End: 2021-08-08

## 2021-08-08 RX ADMIN — EPINEPHRINE BITARTRATE 3 ML: 1 POWDER at 15:34

## 2021-08-08 RX ADMIN — CLOSTRIDIUM TETANI TOXOID ANTIGEN (FORMALDEHYDE INACTIVATED), CORYNEBACTERIUM DIPHTHERIAE TOXOID ANTIGEN (FORMALDEHYDE INACTIVATED), BORDETELLA PERTUSSIS TOXOID ANTIGEN (GLUTARALDEHYDE INACTIVATED), BORDETELLA PERTUSSIS FILAMENTOUS HEMAGGLUTININ ANTIGEN (FORMALDEHYDE INACTIVATED), BORDETELLA PERTUSSIS PERTACTIN ANTIGEN, AND BORDETELLA PERTUSSIS FIMBRIAE 2/3 ANTIGEN 0.5 ML: 5; 2; 2.5; 5; 3; 5 INJECTION, SUSPENSION INTRAMUSCULAR at 17:11

## 2021-08-08 RX ADMIN — Medication 150 MG: at 15:36

## 2021-08-08 ASSESSMENT — ENCOUNTER SYMPTOMS
FEVER: 0
WOUND: 1
ARTHRALGIAS: 1
DIARRHEA: 0
VOMITING: 0
NAUSEA: 1
NECK PAIN: 0
COUGH: 0

## 2021-08-08 NOTE — ED PROVIDER NOTES
History   Chief Complaint:  Fall      HPI   Nan Sanford is a 81 year old female with history of hypertension and anxiety who presents with a fall. Patient states that she was shopping earlier today when she tripped on the part of the floor where the linoleum met carpeting which caught her shoe and she fell face forward. She attempted to break her fall with her right wrist out, and she does complain of some right wrist pain.   She sustained a laceration on her left forehead above her eyebrow. She also states that she has right upper wrist pain. Denies any loss of consciousness, lower extremity pain, back pain, or neck pain. Of note patient  last tetanus 2015.     Review of Systems   Constitutional: Negative for fever.   Respiratory: Negative for cough.    Gastrointestinal: Positive for nausea. Negative for diarrhea and vomiting.   Musculoskeletal: Positive for arthralgias (right wrist). Negative for neck pain.   Skin: Positive for wound (left forehead above eyebrow).   Neurological: Negative for syncope.   All other systems reviewed and are negative.      Allergies:  The patient has no known allergies.     Medications:  Zofran  Fosamax  Tenormin  Folvite  Microzide   Rhematrex   Hydrochlorothiazide     Past Medical History:    Hypertension  Ovarian cyst  Polymyalgia rheumatica  Adenomatous polyp of colon  Anxiety  Vitamin D deficiency   Osteoporosis   Obesity     Past Surgical History:    Tonsillectomy  Hysteroscopy  Mammaplasty reduction  Breast biopsy  Cataract removal     Family History:    Cancer  Hyperlipidemia  Hypertension  Cataract    Social History:  Patient presents alone.     Physical Exam     Patient Vitals for the past 24 hrs:   BP Temp Temp src Pulse Resp SpO2   08/08/21 1530 (!) 160/60 98.2  F (36.8  C) Oral 67 18 96 %       Physical Exam    Physical Exam   Constitutional:  Patient is oriented to person, place, and time. They appear well-developed and well-nourished. Mild distress secondary to fall    HENT:    Left forehead laceration above the eyebrow 5cm. No neck pain  Mouth/Throat:   Oropharynx is clear and moist.   Eyes:    Conjunctivae normal and EOM are normal. Pupils are equal, round, and reactive to light.   Neck:    Normal range of motion. No tenderness to palpation  Cardiovascular: Normal rate, regular rhythm and normal heart sounds.  Exam reveals no gallop and no friction rub.  No murmur heard.  Pulmonary/Chest:  Effort normal and breath sounds normal. Patient has no wheezes. Patient has no rales.   Abdominal:   Soft. Bowel sounds are normal. Patient exhibits no mass. There is no tenderness. There is no rebound and no guarding.   Musculoskeletal:  Right wrist limited range of motion and mild tenderness. Patient exhibits no edema or deformities. Lower extremities full range of motion.   Neurological:   Patient is alert and oriented to person, place, and time. Patient has normal strength. No cranial nerve deficit or sensory deficit. GCS 15  Skin:   Skin is warm and dry. No rash noted. No erythema.   Psychiatric:   Patient has a normal mood and affect. Patient's behavior is normal. Judgment and thought content normal.       Emergency Department Course     Imaging:  CT head w/o contrast  1. No acute intracranial abnormality.  2. Left forehead laceration/scalp contusion.  Per radiology     XR wrist right G/E  Advanced first carpometacarpal degenerative changes. No acute fracture. Mild chondrocalcinosis of the triangular fibrocartilage in the wrist. Small soft tissue calcification ulnar aspect of fifth metacarpal head.  Per radiology       Procedures    Laceration Repair        LACERATION:  A simple clean 5 cm laceration.      LOCATION:  Left forehead above eyebrow      FUNCTION:  Distally sensation, circulation, motor and tendon function are intact.      ANESTHESIA:  LET - Topical      PREPARATION:  Scrubbing with Normal Saline and Shur Clens      DEBRIDEMENT:  no debridement      CLOSURE:  Wound was  closed with One Layer.  Skin closed with 9 x 6.0 Ethylon using interrupted sutures.    Emergency Department Course:    Reviewed:  I reviewed nursing notes, vitals, past medical history and care everywhere    Assessments:  1521 I obtained history and examined the patient as noted above.   1602 I rechecked the patient and explained findings.   1641 I performed a laceration repair on the patient.   1700    Ace wrap applied to the right wrist.    Interventions:  1711 Tdap, 0.5 mL, intramuscular    Disposition:  The patient was discharged to home.       Impression & Plan       Medical Decision Making:  Nan Sanford is an 81-year-old female presenting to the emergency department with mechanical fall.  She sustained a laceration to the forehead which was repaired without any difficulties as noted above.  She was updated on her tetanus.  I did CT the head to ensure there was no intracranial injury from this fall and fortunately this is normal.  X-ray was also done of the wrist and there fortunately is no fracture.  She likely sustained a sprain trying to break her fall.  She was put in an Ace wrap.  She has not had any acute medical illness and this was a provoked fall by an uneven part of the floor.  At this time we did discuss wound care.  She will follow-up with her primary care doctor in 6 days for to have the sutures removed.  She will use ice to the painful areas as well.    Covid-19  Nan Sanford was evaluated during a global COVID-19 pandemic, which necessitated consideration that the patient might be at risk for infection with the SARS-CoV-2 virus that causes COVID-19.   Applicable protocols for evaluation were followed during the patient's care.     Diagnosis:    ICD-10-CM    1. Closed head injury, initial encounter  S09.90XA    2. Facial laceration, initial encounter  S01.81XA    3. Wrist sprain, right, initial encounter  S63.501A        Discharge Medications:  New Prescriptions    ONDANSETRON (ZOFRAN ODT) 4 MG  ODT TAB    Take 1 tablet (4 mg) by mouth every 8 hours as needed       Scribe Disclosure:  I, Juli Titi, am serving as a scribe at 3:21 PM on 8/8/2021 to document services personally performed by Mona Morales MD  based on my observations and the provider's statements to me.          Mona Morales MD  08/08/21 [id number]

## 2024-02-16 ENCOUNTER — APPOINTMENT (OUTPATIENT)
Dept: CT IMAGING | Facility: CLINIC | Age: 84
End: 2024-02-16
Attending: EMERGENCY MEDICINE
Payer: MEDICARE

## 2024-02-16 ENCOUNTER — APPOINTMENT (OUTPATIENT)
Dept: MRI IMAGING | Facility: CLINIC | Age: 84
End: 2024-02-16
Attending: EMERGENCY MEDICINE
Payer: MEDICARE

## 2024-02-16 ENCOUNTER — HOSPITAL ENCOUNTER (EMERGENCY)
Facility: CLINIC | Age: 84
Discharge: HOME | End: 2024-02-16
Attending: EMERGENCY MEDICINE | Admitting: EMERGENCY MEDICINE
Payer: MEDICARE

## 2024-02-16 VITALS
BODY MASS INDEX: 30.21 KG/M2 | RESPIRATION RATE: 16 BRPM | HEIGHT: 61 IN | HEART RATE: 67 BPM | SYSTOLIC BLOOD PRESSURE: 177 MMHG | TEMPERATURE: 98 F | WEIGHT: 160 LBS | DIASTOLIC BLOOD PRESSURE: 72 MMHG | OXYGEN SATURATION: 98 %

## 2024-02-16 DIAGNOSIS — R42 VERTIGO: ICD-10-CM

## 2024-02-16 LAB
ALBUMIN UR-MCNC: NEGATIVE MG/DL
ANION GAP SERPL CALCULATED.3IONS-SCNC: 9 MMOL/L (ref 7–15)
APPEARANCE UR: CLEAR
ATRIAL RATE - MUSE: 56 BPM
BASOPHILS # BLD AUTO: 0.1 10E3/UL (ref 0–0.2)
BASOPHILS NFR BLD AUTO: 1 %
BILIRUB UR QL STRIP: NEGATIVE
BUN SERPL-MCNC: 21.9 MG/DL (ref 8–23)
CALCIUM SERPL-MCNC: 8.9 MG/DL (ref 8.8–10.2)
CHLORIDE SERPL-SCNC: 101 MMOL/L (ref 98–107)
COLOR UR AUTO: ABNORMAL
CREAT SERPL-MCNC: 0.77 MG/DL (ref 0.51–0.95)
DEPRECATED HCO3 PLAS-SCNC: 28 MMOL/L (ref 22–29)
DIASTOLIC BLOOD PRESSURE - MUSE: NORMAL MMHG
EGFRCR SERPLBLD CKD-EPI 2021: 76 ML/MIN/1.73M2
EOSINOPHIL # BLD AUTO: 0.1 10E3/UL (ref 0–0.7)
EOSINOPHIL NFR BLD AUTO: 1 %
ERYTHROCYTE [DISTWIDTH] IN BLOOD BY AUTOMATED COUNT: 11.8 % (ref 10–15)
GLUCOSE SERPL-MCNC: 103 MG/DL (ref 70–99)
GLUCOSE UR STRIP-MCNC: NEGATIVE MG/DL
HCT VFR BLD AUTO: 37.7 % (ref 35–47)
HGB BLD-MCNC: 12.9 G/DL (ref 11.7–15.7)
HGB UR QL STRIP: NEGATIVE
HOLD SPECIMEN: NORMAL
IMM GRANULOCYTES # BLD: 0 10E3/UL
IMM GRANULOCYTES NFR BLD: 0 %
INTERPRETATION ECG - MUSE: NORMAL
KETONES UR STRIP-MCNC: NEGATIVE MG/DL
LEUKOCYTE ESTERASE UR QL STRIP: ABNORMAL
LYMPHOCYTES # BLD AUTO: 1.9 10E3/UL (ref 0.8–5.3)
LYMPHOCYTES NFR BLD AUTO: 31 %
MCH RBC QN AUTO: 32 PG (ref 26.5–33)
MCHC RBC AUTO-ENTMCNC: 34.2 G/DL (ref 31.5–36.5)
MCV RBC AUTO: 94 FL (ref 78–100)
MONOCYTES # BLD AUTO: 0.5 10E3/UL (ref 0–1.3)
MONOCYTES NFR BLD AUTO: 9 %
NEUTROPHILS # BLD AUTO: 3.4 10E3/UL (ref 1.6–8.3)
NEUTROPHILS NFR BLD AUTO: 58 %
NITRATE UR QL: NEGATIVE
NRBC # BLD AUTO: 0 10E3/UL
NRBC BLD AUTO-RTO: 0 /100
P AXIS - MUSE: 91 DEGREES
PH UR STRIP: 7.5 [PH] (ref 5–7)
PLATELET # BLD AUTO: 212 10E3/UL (ref 150–450)
POTASSIUM SERPL-SCNC: 4 MMOL/L (ref 3.4–5.3)
PR INTERVAL - MUSE: 206 MS
QRS DURATION - MUSE: 74 MS
QT - MUSE: 432 MS
QTC - MUSE: 416 MS
R AXIS - MUSE: 21 DEGREES
RBC # BLD AUTO: 4.03 10E6/UL (ref 3.8–5.2)
RBC URINE: <1 /HPF
SODIUM SERPL-SCNC: 138 MMOL/L (ref 135–145)
SP GR UR STRIP: 1.01 (ref 1–1.03)
SQUAMOUS EPITHELIAL: 3 /HPF
SYSTOLIC BLOOD PRESSURE - MUSE: NORMAL MMHG
T AXIS - MUSE: 55 DEGREES
TROPONIN T SERPL HS-MCNC: 8 NG/L
UROBILINOGEN UR STRIP-MCNC: NORMAL MG/DL
VENTRICULAR RATE- MUSE: 56 BPM
WBC # BLD AUTO: 6 10E3/UL (ref 4–11)
WBC URINE: 9 /HPF

## 2024-02-16 PROCEDURE — 81003 URINALYSIS AUTO W/O SCOPE: CPT | Performed by: EMERGENCY MEDICINE

## 2024-02-16 PROCEDURE — 99285 EMERGENCY DEPT VISIT HI MDM: CPT | Mod: 25

## 2024-02-16 PROCEDURE — 70544 MR ANGIOGRAPHY HEAD W/O DYE: CPT | Mod: MG,XU

## 2024-02-16 PROCEDURE — 255N000002 HC RX 255 OP 636: Performed by: EMERGENCY MEDICINE

## 2024-02-16 PROCEDURE — 36415 COLL VENOUS BLD VENIPUNCTURE: CPT | Performed by: EMERGENCY MEDICINE

## 2024-02-16 PROCEDURE — 93005 ELECTROCARDIOGRAM TRACING: CPT

## 2024-02-16 PROCEDURE — 80048 BASIC METABOLIC PNL TOTAL CA: CPT | Performed by: EMERGENCY MEDICINE

## 2024-02-16 PROCEDURE — 84484 ASSAY OF TROPONIN QUANT: CPT | Performed by: EMERGENCY MEDICINE

## 2024-02-16 PROCEDURE — 70450 CT HEAD/BRAIN W/O DYE: CPT | Mod: MF

## 2024-02-16 PROCEDURE — 70549 MR ANGIOGRAPH NECK W/O&W/DYE: CPT | Mod: MG

## 2024-02-16 PROCEDURE — A9585 GADOBUTROL INJECTION: HCPCS | Performed by: EMERGENCY MEDICINE

## 2024-02-16 PROCEDURE — 70553 MRI BRAIN STEM W/O & W/DYE: CPT | Mod: MG

## 2024-02-16 PROCEDURE — 85048 AUTOMATED LEUKOCYTE COUNT: CPT | Performed by: EMERGENCY MEDICINE

## 2024-02-16 RX ORDER — GADOBUTROL 604.72 MG/ML
10 INJECTION INTRAVENOUS ONCE
Status: COMPLETED | OUTPATIENT
Start: 2024-02-16 | End: 2024-02-16

## 2024-02-16 RX ORDER — MECLIZINE HYDROCHLORIDE 25 MG/1
25 TABLET ORAL 3 TIMES DAILY PRN
Qty: 30 TABLET | Refills: 0 | Status: SHIPPED | OUTPATIENT
Start: 2024-02-16

## 2024-02-16 RX ADMIN — GADOBUTROL 10 ML: 604.72 INJECTION INTRAVENOUS at 15:40

## 2024-02-16 ASSESSMENT — ACTIVITIES OF DAILY LIVING (ADL)
ADLS_ACUITY_SCORE: 38

## 2024-02-16 NOTE — ED TRIAGE NOTES
BIBA from home. Woke up today at 0530 and felt very dizzy. Had trouble walking to a chair to sit down. Feels fine now while lying in bed. Hx of HTN treated and controlled with meds. No Hx of stroke. Denies CP, SOB, no vision change, no HA. Nausea earlier but not now     Triage Assessment (Adult)       Row Name 02/16/24 0816          Triage Assessment    Airway WDL WDL        Respiratory WDL    Respiratory WDL WDL        Skin Circulation/Temperature WDL    Skin Circulation/Temperature WDL WDL        Cardiac WDL    Cardiac WDL WDL        Peripheral/Neurovascular WDL    Peripheral Neurovascular WDL WDL        Cognitive/Neuro/Behavioral WDL    Cognitive/Neuro/Behavioral WDL WDL

## 2024-02-16 NOTE — ED PROVIDER NOTES
Patient's care was signed out to me by Dr. Herron to follow-up on the MRI of her brain and the MRAs of her head and neck blood vessels.  Her MRI of her brain results returned and there is no stroke, intracranial bleed or acute abnormality.  I discussed the incidental findings of the 2 small areas that are concerning for tiny aneurysms with Kellen the neurosurgery physician assistant.  These findings were also discussed with the patient.  The patient requested to be discharged home without waiting for the response from neurosurgery regarding recommendations, so she was referred to follow-up in clinic with neurosurgery and told to call Monday for follow-up appointment.  She was also referred to the dizziness and balance clinic for follow-up.  She was told to return if her symptoms worsen.  She was also told to follow-up with her primary care clinic next week.     Lisa Godfrey MD  02/16/24 5824       Lisa Godfrey MD  02/16/24 3039

## 2024-02-16 NOTE — ED PROVIDER NOTES
"  History     Chief Complaint:  Dizziness    The history is provided by the patient.     Nan Sanford is a 84 year old female with history of hypertension presenting via EMS for evaluation of dizziness. Nan states that she woke up this morning (0530) with dizziness that caused her to \"fall into walls\" as she walked, adding that she went to sleep at baseline last night. She explains that she went back to sleep and woke up later with the dizziness still present and possibly more severe. She adds that she felt some nausea and leg shakiness when she tried to walk and reports mild pain in the back of her head now. She notes that the dizziness is improved with lying still. She denies numbness or weakness in her extremities. She denies any new medications. She denies a history of vertigo. She denies a cardiac history but adds that her grandmother  of a stroke. She lives and walks independently.    Independent Historian:   None - Patient Only    Review of External Notes:   N/a    Medications:    Fosamax  Tenormin  Caltrate  Microzide  Methotrexate  Aspirin  Benadryl  Zestoretic  Zofran    Past Medical History:    Hypertension  Ovarian cyst  Polymyalgia rheumatica  Adenomatous polyp of colon  Anxiety  Endometrial polyp  Osteoarthritis    Past Surgical History:    Breast surgery  ENT surgery  Gyn surgery  Orthopedic surgery  Knee replacement  Tonsillectomy  Cataract extraction w/ intraocular lens implant, left  Cataract extraction w/ intraocular lens implant, right  Hysteroscopy surg  Reduction mammaplasty, bilateral    Physical Exam   Patient Vitals for the past 24 hrs:   BP Temp Temp src Pulse Resp SpO2 Height Weight   24 0819 (!) 189/77 98  F (36.7  C) Temporal 60 16 96 % 1.537 m (5' 0.5\") 72.6 kg (160 lb)     Physical Exam  General: Alert and cooperative with exam. Patient in mild distress. Normal mentation.  Head:  Scalp is NC/AT  Eyes:  No scleral icterus, PERRL, EOMI   ENT:  The external nose and ears are " normal. The oropharynx is normal and without erythema; mucus membranes are moist.   Neck:  Normal range of motion without rigidity.  CV:  Regular rate and rhythm    No pathologic murmur   Resp:  Breath sounds are clear bilaterally    Non-labored, no retractions or accessory muscle use  GI:  Abdomen is soft, no distension, no tenderness. No peritoneal signs  MS:  No lower extremity edema   Skin:  Warm and dry, No rash or lesions noted.  Neuro: Oriented x 3. No gross motor deficits.    Strength and sensation grossly intact in all 4 extremities.      Cranial nerves 2-12 intact.    GCS: 15    Normal finger to nose  testing    No nystagmus present    Emergency Department Course   ECG  ECG taken at 0946,   Sinus bradycardia  Otherwise normal ECG   Rate 56 bpm. AR interval 206 ms. QRS duration 74 ms. QT/QTc 432/416 ms. P-R-T axes 91 21 55.     Imaging:  Head CT w/o contrast   Final Result   IMPRESSION: No acute intracranial pathology.       GUSTAVO DANIELS MD            SYSTEM ID:  [id number]      MR Brain w/o & w Contrast    (Results Pending)   MRA Angiogram Head w/o Contrast    (Results Pending)   MRA Angiogram Neck w/o & w Contrast    (Results Pending)     Laboratory:  Labs Ordered and Resulted from Time of ED Arrival to Time of ED Departure   BASIC METABOLIC PANEL - Abnormal       Result Value    Sodium 138      Potassium 4.0      Chloride 101      Carbon Dioxide (CO2) 28      Anion Gap 9      Urea Nitrogen 21.9      Creatinine 0.77      GFR Estimate 76      Calcium 8.9      Glucose 103 (*)    UA MACROSCOPIC WITH REFLEX TO MICRO AND CULTURE - Abnormal    Color Urine Light Yellow      Appearance Urine Clear      Glucose Urine Negative      Bilirubin Urine Negative      Ketones Urine Negative      Specific Gravity Urine 1.013      Blood Urine Negative      pH Urine 7.5 (*)     Protein Albumin Urine Negative      Urobilinogen Urine Normal      Nitrite Urine Negative      Leukocyte Esterase Urine Trace (*)     RBC Urine <1       WBC Urine 9 (*)     Squamous Epithelials Urine 3 (*)    TROPONIN T, HIGH SENSITIVITY - Normal    Troponin T, High Sensitivity 8     CBC WITH PLATELETS AND DIFFERENTIAL    WBC Count 6.0      RBC Count 4.03      Hemoglobin 12.9      Hematocrit 37.7      MCV 94      MCH 32.0      MCHC 34.2      RDW 11.8      Platelet Count 212      % Neutrophils 58      % Lymphocytes 31      % Monocytes 9      % Eosinophils 1      % Basophils 1      % Immature Granulocytes 0      NRBCs per 100 WBC 0      Absolute Neutrophils 3.4      Absolute Lymphocytes 1.9      Absolute Monocytes 0.5      Absolute Eosinophils 0.1      Absolute Basophils 0.1      Absolute Immature Granulocytes 0.0      Absolute NRBCs 0.0       Emergency Department Course & Assessments:    Assessments:  0834 I obtained history and examined the patient as noted above.  0950 I rechecked the patient.    Independent Interpretation (X-rays, CTs, rhythm strip):  None    Consultations/Discussion of Management or Tests:  None        Social Determinants of Health affecting care:   None    Disposition:  Care of the patient was transferred to my colleague Dr. Godfrey pending MR/MRA results.    Impression & Plan    Medical Decision Making:  Nan Sanford is a 84 year old female who presents for evaluation of vertigo. The differential diagnosis of vertigo is broad and includes common etiologies such as menieres disease, labyrinthitis, benign positional vertigo, otitis media, etc.  More serious etiologies considered include central etiologies such as tumor, intracerebral bleed, dissection, ischemic cerebral vascular accident.  On initial evaluation patient had no focal neurologic deficits and had noted improvement in vertigo; patient offered but deferred medication management.  She was noted to be hypertensive and given her age and reported symptoms, head CT was obtained and unremarkable for acute pathology.  MRI/MRA pending.  Labs, EKG, and UA without significant findings.   Patient signed out to my partner Dr. Godfrey.  If MRI without acute pathology then episode likely secondary to peripheral vertigo and can be reassessed and potentially discharged with close PCP follow-up; will provide PRN prescription for meclizine for symptom relief.    Diagnosis:    ICD-10-CM    1. Vertigo  R42            Discharge Medications:  New Prescriptions    MECLIZINE (ANTIVERT) 25 MG TABLET    Take 1 tablet (25 mg) by mouth 3 times daily as needed for dizziness     Scribe Disclosure:  JOHNATHAN, Nishant Miramontes, am serving as a scribe at 8:28 AM on 2/16/2024 to document services personally performed by Wilfred Herron DO based on my observations and the provider's statements to me.   2/16/2024   Wilfred Herron DO O'Neill, Christopher Warren, DO  02/16/24 1459

## 2024-02-16 NOTE — ED NOTES
Bed: ED25  Expected date:   Expected time:   Means of arrival:   Comments:  Rae 1 84 F dizzy eta 0800

## 2024-02-21 ENCOUNTER — OFFICE VISIT (OUTPATIENT)
Dept: NEUROSURGERY | Facility: CLINIC | Age: 84
End: 2024-02-21
Payer: MEDICARE

## 2024-02-21 VITALS
OXYGEN SATURATION: 97 % | DIASTOLIC BLOOD PRESSURE: 77 MMHG | WEIGHT: 160 LBS | HEART RATE: 62 BPM | SYSTOLIC BLOOD PRESSURE: 127 MMHG | HEIGHT: 60 IN | BODY MASS INDEX: 31.41 KG/M2

## 2024-02-21 DIAGNOSIS — I67.1 CEREBRAL ANEURYSM WITHOUT RUPTURE: Primary | ICD-10-CM

## 2024-02-21 PROCEDURE — 99203 OFFICE O/P NEW LOW 30 MIN: CPT | Mod: GC | Performed by: RADIOLOGY

## 2024-02-21 NOTE — PROGRESS NOTES
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     She has hypertension that is controlled, she has no smoking history and no family history. 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 She presented to the ED with dizziness and nausea that has resolved and had a stroke work-up which revealed the aneurysms.             Past medical history:   Past Medical History:   Diagnosis Date    Hypertension     Ovarian cyst     Ruptured    Polymyalgia rheumatica (H)         Current outpatient Medication list:   Current Outpatient Medications:     acetaminophen (TYLENOL) 500 MG tablet, Take 500-1,000 mg by mouth every 6 hours as needed for mild pain, Disp: , Rfl:     alendronate (FOSAMAX) 70 MG tablet, Take 70 mg by mouth every 7 days, Disp: , Rfl:     atenolol (TENORMIN) 50 MG tablet, Take 100 mg by mouth daily, Disp: , Rfl:     calcium-vitamin D (CALTRATE) 600-400 MG-UNIT per tablet, Take 1 tablet by mouth daily, Disp: , Rfl:     Cholecalciferol (VITAMIN D3 PO), Take 5,000 Units by mouth daily, Disp: , Rfl:     folic acid (FOLVITE) 1 MG tablet, Take 1 mg by mouth daily, Disp: , Rfl:     hydrochlorothiazide (MICROZIDE) 12.5 MG capsule, Take 12.5 mg by mouth daily, Disp: , Rfl:     meclizine (ANTIVERT) 25 MG tablet, Take 1 tablet (25 mg) by mouth 3 times daily as needed for dizziness, Disp: 30 tablet, Rfl: 0    methotrexate 2.5 MG tablet, Take 7.5 mg by mouth every 7 days, Disp: , Rfl:       Family history: No family history on file.    Social history:   Social History     Tobacco Use    Smoking status: Never   Substance Use Topics    Alcohol use: No    Drug use: No       Allergies:  No Known Allergies    Review of Systems: 5 point ROS performed and negative except for detailed above    Objective:    Physical exam:  There were no  vitals taken for this visit.  Constitutional: Awake, no acute distress  HENT: normcephalic,   Respiratory: normal rate, no acute distress  Neuro:  Mental status: awake, oriented   CN: EOMI, face movements symmetric, no dysarthria, equal shoulder shrug, tongue midline  Motor: 5/5 strength in upper and lower extremities bilaterally  Sensory: equal to light touch bilaterally in upper and lower extremities  Coordination: Normal finger to nose in upper extremities bilaterally, normal heel-to-shin in lower extremities bilaterally    Imaging Reviewed:      MRA head and neck 2024:   A 2 to 3 mm saccular outpouching is present projecting medially off  the right internal carotid artery near the ophthalmic artery origin  (series 4 image 67), probably aneurysm.     Smaller linear outpouching just proximal to this location measuring  about 1 to 2 mm (series 4 image 67) which could represent aneurysm or  infundibulum.      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 Discussed the natural course of aneurysms the mortality and morbidity with aneurysm rupture. The size of the aneurysm is approximately 2-3 and 1-2 given this size and location of the aneurysm this carries a less than 1% rupture risk over a 5 year time period. Discussed the risk factors for aneurysm rupture and growth including smoking, hypertension that is uncontrolled and family history.     Discussed the 3 treatment strategies with aneurysms that includes observation with serial imaging studies, surgical approach for aneurysm treatment and endovascular approach. Discussed the rupture risk of the aneurysm and the resulting morbidity and mortality of subarachnoid hemorrhage due to aneurysmal rupture.     Discussed with the patient the various treatment approaches for aneurysms that includes observation over time with serial imaging, surgical approach or endovascular  approach. Discussed the risk associated with endovascular treatment of aneurysms which includes stroke 2-3%,aneurysm rupture, bleeding, bruising, groin or wrist hematoma, arteriovenous malformation or fistula development, retroperitoneal hemorrhage, artery perforation or dissection.   Discussed the risks associated with surgical approach which includes a longer recovery time with cognitive deficits, aneurysm rupture, bleeding, infection, artery perforation.       Thank you for this referral, for any questions or concerns please don't hesitate to contact us.     Plan:  - MRA head 3 years  - Return to clinic after repeat imaging    Makayla Albert MD  Endovascular Surgical Neuroradiology Fellow  [facility]  [phone]    Endovascular Surgical Neuroradiology staff is Dr. Moreland

## 2024-02-21 NOTE — LETTER
2/21/2024         RE: Nan Sanford  [address]  [facility] [address]        Dear Colleague,    Thank you for referring your patient, Nan Sanford, to the [facility]. Please see a copy of my visit note below.    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     She has hypertension that is controlled, she has no smoking history and no family history. 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 She presented to the ED with dizziness and nausea that has resolved and had a stroke work-up which revealed the aneurysms.             Past medical history:   Past Medical History:   Diagnosis Date     Hypertension      Ovarian cyst     Ruptured     Polymyalgia rheumatica (H)         Current outpatient Medication list:   Current Outpatient Medications:      acetaminophen (TYLENOL) 500 MG tablet, Take 500-1,000 mg by mouth every 6 hours as needed for mild pain, Disp: , Rfl:      alendronate (FOSAMAX) 70 MG tablet, Take 70 mg by mouth every 7 days, Disp: , Rfl:      atenolol (TENORMIN) 50 MG tablet, Take 100 mg by mouth daily, Disp: , Rfl:      calcium-vitamin D (CALTRATE) 600-400 MG-UNIT per tablet, Take 1 tablet by mouth daily, Disp: , Rfl:      Cholecalciferol (VITAMIN D3 PO), Take 5,000 Units by mouth daily, Disp: , Rfl:      folic acid (FOLVITE) 1 MG tablet, Take 1 mg by mouth daily, Disp: , Rfl:      hydrochlorothiazide (MICROZIDE) 12.5 MG capsule, Take 12.5 mg by mouth daily, Disp: , Rfl:      meclizine (ANTIVERT) 25 MG tablet, Take 1 tablet (25 mg) by mouth 3 times daily as needed for dizziness, Disp: 30 tablet, Rfl: 0     methotrexate 2.5 MG tablet, Take 7.5 mg by mouth every 7 days, Disp: , Rfl:       Family history: No family history on file.    Social history:    Social History     Tobacco Use     Smoking status: Never   Substance Use Topics     Alcohol use: No     Drug use: No       Allergies:  No Known Allergies    Review of Systems: 5 point ROS performed and negative except for detailed above    Objective:    Physical exam:  There were no vitals taken for this visit.  Constitutional: Awake, no acute distress  HENT: normcephalic,   Respiratory: normal rate, no acute distress  Neuro:  Mental status: awake, oriented   CN: EOMI, face movements symmetric, no dysarthria, equal shoulder shrug, tongue midline  Motor: 5/5 strength in upper and lower extremities bilaterally  Sensory: equal to light touch bilaterally in upper and lower extremities  Coordination: Normal finger to nose in upper extremities bilaterally, normal heel-to-shin in lower extremities bilaterally    Imaging Reviewed:      MRA head and neck 2024:   A 2 to 3 mm saccular outpouching is present projecting medially off  the right internal carotid artery near the ophthalmic artery origin  (series 4 image 67), probably aneurysm.     Smaller linear outpouching just proximal to this location measuring  about 1 to 2 mm (series 4 image 67) which could represent aneurysm or  infundibulum.      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 Discussed the natural course of aneurysms the mortality and morbidity with aneurysm rupture. The size of the aneurysm is approximately 2-3 and 1-2 given this size and location of the aneurysm this carries a less than 1% rupture risk over a 5 year time period. Discussed the risk factors for aneurysm rupture and growth including smoking, hypertension that is uncontrolled and family history.     Discussed the 3 treatment strategies with aneurysms that includes observation with serial imaging studies, surgical approach for aneurysm treatment and endovascular approach. Discussed the rupture  risk of the aneurysm and the resulting morbidity and mortality of subarachnoid hemorrhage due to aneurysmal rupture.     Discussed with the patient the various treatment approaches for aneurysms that includes observation over time with serial imaging, surgical approach or endovascular approach. Discussed the risk associated with endovascular treatment of aneurysms which includes stroke 2-3%,aneurysm rupture, bleeding, bruising, groin or wrist hematoma, arteriovenous malformation or fistula development, retroperitoneal hemorrhage, artery perforation or dissection.   Discussed the risks associated with surgical approach which includes a longer recovery time with cognitive deficits, aneurysm rupture, bleeding, infection, artery perforation.       Thank you for this referral, for any questions or concerns please don't hesitate to contact us.     Plan:  - MRA head 3 years  - Return to clinic after repeat imaging    Makayla Albert MD  Endovascular Surgical Neuroradiology Fellow  [facility]  [phone]    Endovascular Surgical Neuroradiology staff is Dr. Moreland                Attestation signed by Shlomo Moreland MD at 2/25/2024  7:31 PM:  I have personally seen and evaluated the patient on February 21 2024  and I agree with the note by Dr. Albert               On February 25, 2024      Again, thank you for allowing me to participate in the care of your patient.        Sincerely,        Shlomo Moreland MD

## 2024-02-21 NOTE — NURSING NOTE
"Nan Sanford is a 84 year old female who presents for:  Chief Complaint   Patient presents with    RECHECK     Aneurysm follow up        Initial Vitals:  /77   Pulse 62   Ht 5' 0.05\" (1.525 m)   Wt 160 lb (72.6 kg)   SpO2 97%   BMI 31.20 kg/m   Estimated body mass index is 31.2 kg/m  as calculated from the following:    Height as of this encounter: 5' 0.05\" (1.525 m).    Weight as of this encounter: 160 lb (72.6 kg).. Body surface area is 1.75 meters squared. BP completed using cuff size: small regular  Data Unavailable        Jacinda Gill   "

## 2025-05-29 ENCOUNTER — APPOINTMENT (OUTPATIENT)
Dept: CT IMAGING | Facility: CLINIC | Age: 85
End: 2025-05-29
Attending: EMERGENCY MEDICINE
Payer: MEDICARE

## 2025-05-29 ENCOUNTER — APPOINTMENT (OUTPATIENT)
Dept: MRI IMAGING | Facility: CLINIC | Age: 85
End: 2025-05-29
Attending: EMERGENCY MEDICINE
Payer: MEDICARE

## 2025-05-29 ENCOUNTER — HOSPITAL ENCOUNTER (OUTPATIENT)
Facility: CLINIC | Age: 85
Setting detail: OBSERVATION
End: 2025-05-29
Attending: EMERGENCY MEDICINE | Admitting: INTERNAL MEDICINE
Payer: MEDICARE

## 2025-05-29 VITALS
OXYGEN SATURATION: 97 % | BODY MASS INDEX: 31.55 KG/M2 | SYSTOLIC BLOOD PRESSURE: 156 MMHG | WEIGHT: 160.72 LBS | DIASTOLIC BLOOD PRESSURE: 69 MMHG | TEMPERATURE: 97.7 F | HEIGHT: 60 IN | HEART RATE: 74 BPM | RESPIRATION RATE: 18 BRPM

## 2025-05-29 DIAGNOSIS — I67.1 CEREBRAL ANEURYSM, NONRUPTURED: Primary | ICD-10-CM

## 2025-05-29 DIAGNOSIS — R53.1 GENERALIZED WEAKNESS: ICD-10-CM

## 2025-05-29 DIAGNOSIS — G45.9 TIA (TRANSIENT ISCHEMIC ATTACK): ICD-10-CM

## 2025-05-29 LAB
ALBUMIN SERPL BCG-MCNC: 3.8 G/DL (ref 3.5–5.2)
ALBUMIN UR-MCNC: NEGATIVE MG/DL
ALP SERPL-CCNC: 83 U/L (ref 40–150)
ALT SERPL W P-5'-P-CCNC: 16 U/L (ref 0–50)
ANION GAP SERPL CALCULATED.3IONS-SCNC: 15 MMOL/L (ref 7–15)
APPEARANCE UR: CLEAR
AST SERPL W P-5'-P-CCNC: 23 U/L (ref 0–45)
ATRIAL RATE - MUSE: 59 BPM
BASOPHILS # BLD AUTO: 0 10E3/UL (ref 0–0.2)
BASOPHILS NFR BLD AUTO: 1 %
BILIRUB SERPL-MCNC: 0.4 MG/DL
BILIRUB UR QL STRIP: NEGATIVE
BUN SERPL-MCNC: 25.9 MG/DL (ref 8–23)
CALCIUM SERPL-MCNC: 9.4 MG/DL (ref 8.8–10.4)
CHLORIDE SERPL-SCNC: 93 MMOL/L (ref 98–107)
CHOLEST SERPL-MCNC: 143 MG/DL
COLOR UR AUTO: ABNORMAL
CREAT SERPL-MCNC: 0.76 MG/DL (ref 0.51–0.95)
DIASTOLIC BLOOD PRESSURE - MUSE: NORMAL MMHG
EGFRCR SERPLBLD CKD-EPI 2021: 77 ML/MIN/1.73M2
EOSINOPHIL # BLD AUTO: 0.1 10E3/UL (ref 0–0.7)
EOSINOPHIL NFR BLD AUTO: 1 %
ERYTHROCYTE [DISTWIDTH] IN BLOOD BY AUTOMATED COUNT: 11.8 % (ref 10–15)
EST. AVERAGE GLUCOSE BLD GHB EST-MCNC: 108 MG/DL
GLUCOSE SERPL-MCNC: 100 MG/DL (ref 70–99)
GLUCOSE UR STRIP-MCNC: NEGATIVE MG/DL
HBA1C MFR BLD: 5.4 %
HCO3 SERPL-SCNC: 25 MMOL/L (ref 22–29)
HCT VFR BLD AUTO: 38.7 % (ref 35–47)
HDLC SERPL-MCNC: 47 MG/DL
HGB BLD-MCNC: 13.8 G/DL (ref 11.7–15.7)
HGB UR QL STRIP: NEGATIVE
HYALINE CASTS: 2 /LPF
IMM GRANULOCYTES # BLD: 0 10E3/UL
IMM GRANULOCYTES NFR BLD: 0 %
INTERPRETATION ECG - MUSE: NORMAL
KETONES UR STRIP-MCNC: ABNORMAL MG/DL
LDLC SERPL CALC-MCNC: 84 MG/DL
LEUKOCYTE ESTERASE UR QL STRIP: ABNORMAL
LYMPHOCYTES # BLD AUTO: 2.6 10E3/UL (ref 0.8–5.3)
LYMPHOCYTES NFR BLD AUTO: 40 %
MCH RBC QN AUTO: 31.9 PG (ref 26.5–33)
MCHC RBC AUTO-ENTMCNC: 35.7 G/DL (ref 31.5–36.5)
MCV RBC AUTO: 90 FL (ref 78–100)
MONOCYTES # BLD AUTO: 0.8 10E3/UL (ref 0–1.3)
MONOCYTES NFR BLD AUTO: 12 %
NEUTROPHILS # BLD AUTO: 3.1 10E3/UL (ref 1.6–8.3)
NEUTROPHILS NFR BLD AUTO: 47 %
NITRATE UR QL: NEGATIVE
NONHDLC SERPL-MCNC: 96 MG/DL
NRBC # BLD AUTO: 0 10E3/UL
NRBC BLD AUTO-RTO: 0 /100
P AXIS - MUSE: 75 DEGREES
PH UR STRIP: 7 [PH] (ref 5–7)
PLATELET # BLD AUTO: 210 10E3/UL (ref 150–450)
POTASSIUM SERPL-SCNC: 3.1 MMOL/L (ref 3.4–5.3)
POTASSIUM SERPL-SCNC: 4 MMOL/L (ref 3.4–5.3)
PR INTERVAL - MUSE: 204 MS
PROT SERPL-MCNC: 6.6 G/DL (ref 6.4–8.3)
QRS DURATION - MUSE: 78 MS
QT - MUSE: 460 MS
QTC - MUSE: 455 MS
R AXIS - MUSE: 47 DEGREES
RBC # BLD AUTO: 4.32 10E6/UL (ref 3.8–5.2)
RBC URINE: 0 /HPF
SODIUM SERPL-SCNC: 133 MMOL/L (ref 135–145)
SP GR UR STRIP: 1.02 (ref 1–1.03)
SQUAMOUS EPITHELIAL: 1 /HPF
SYSTOLIC BLOOD PRESSURE - MUSE: NORMAL MMHG
T AXIS - MUSE: 47 DEGREES
TRIGL SERPL-MCNC: 58 MG/DL
TROPONIN T SERPL HS-MCNC: 11 NG/L
TROPONIN T SERPL HS-MCNC: 11 NG/L
UROBILINOGEN UR STRIP-MCNC: NORMAL MG/DL
VENTRICULAR RATE- MUSE: 59 BPM
WBC # BLD AUTO: 6.6 10E3/UL (ref 4–11)
WBC URINE: 2 /HPF

## 2025-05-29 PROCEDURE — 70496 CT ANGIOGRAPHY HEAD: CPT

## 2025-05-29 PROCEDURE — 82465 ASSAY BLD/SERUM CHOLESTEROL: CPT | Performed by: INTERNAL MEDICINE

## 2025-05-29 PROCEDURE — 250N000013 HC RX MED GY IP 250 OP 250 PS 637: Performed by: PSYCHIATRY & NEUROLOGY

## 2025-05-29 PROCEDURE — 82247 BILIRUBIN TOTAL: CPT | Performed by: EMERGENCY MEDICINE

## 2025-05-29 PROCEDURE — 93005 ELECTROCARDIOGRAM TRACING: CPT

## 2025-05-29 PROCEDURE — 36415 COLL VENOUS BLD VENIPUNCTURE: CPT | Performed by: INTERNAL MEDICINE

## 2025-05-29 PROCEDURE — 70450 CT HEAD/BRAIN W/O DYE: CPT

## 2025-05-29 PROCEDURE — 96374 THER/PROPH/DIAG INJ IV PUSH: CPT | Mod: 59

## 2025-05-29 PROCEDURE — 250N000011 HC RX IP 250 OP 636: Performed by: EMERGENCY MEDICINE

## 2025-05-29 PROCEDURE — G0378 HOSPITAL OBSERVATION PER HR: HCPCS

## 2025-05-29 PROCEDURE — 84484 ASSAY OF TROPONIN QUANT: CPT | Performed by: EMERGENCY MEDICINE

## 2025-05-29 PROCEDURE — 258N000003 HC RX IP 258 OP 636: Performed by: EMERGENCY MEDICINE

## 2025-05-29 PROCEDURE — 83036 HEMOGLOBIN GLYCOSYLATED A1C: CPT | Performed by: INTERNAL MEDICINE

## 2025-05-29 PROCEDURE — 85004 AUTOMATED DIFF WBC COUNT: CPT | Performed by: EMERGENCY MEDICINE

## 2025-05-29 PROCEDURE — 81003 URINALYSIS AUTO W/O SCOPE: CPT | Performed by: EMERGENCY MEDICINE

## 2025-05-29 PROCEDURE — 96361 HYDRATE IV INFUSION ADD-ON: CPT

## 2025-05-29 PROCEDURE — A9585 GADOBUTROL INJECTION: HCPCS | Performed by: EMERGENCY MEDICINE

## 2025-05-29 PROCEDURE — 250N000009 HC RX 250: Performed by: EMERGENCY MEDICINE

## 2025-05-29 PROCEDURE — 255N000002 HC RX 255 OP 636: Performed by: EMERGENCY MEDICINE

## 2025-05-29 PROCEDURE — 36415 COLL VENOUS BLD VENIPUNCTURE: CPT | Performed by: EMERGENCY MEDICINE

## 2025-05-29 PROCEDURE — 250N000013 HC RX MED GY IP 250 OP 250 PS 637: Performed by: INTERNAL MEDICINE

## 2025-05-29 PROCEDURE — 70553 MRI BRAIN STEM W/O & W/DYE: CPT

## 2025-05-29 PROCEDURE — 99223 1ST HOSP IP/OBS HIGH 75: CPT | Mod: AI | Performed by: INTERNAL MEDICINE

## 2025-05-29 PROCEDURE — 99285 EMERGENCY DEPT VISIT HI MDM: CPT | Mod: 25

## 2025-05-29 PROCEDURE — 84132 ASSAY OF SERUM POTASSIUM: CPT | Performed by: INTERNAL MEDICINE

## 2025-05-29 RX ORDER — POTASSIUM CHLORIDE 1500 MG/1
40 TABLET, EXTENDED RELEASE ORAL ONCE
Status: COMPLETED | OUTPATIENT
Start: 2025-05-29 | End: 2025-05-29

## 2025-05-29 RX ORDER — LABETALOL HYDROCHLORIDE 5 MG/ML
10-40 INJECTION, SOLUTION INTRAVENOUS EVERY 10 MIN PRN
Status: DISCONTINUED | OUTPATIENT
Start: 2025-05-29 | End: 2025-05-30 | Stop reason: HOSPADM

## 2025-05-29 RX ORDER — ACETAMINOPHEN 650 MG/1
650 SUPPOSITORY RECTAL EVERY 4 HOURS PRN
Status: DISCONTINUED | OUTPATIENT
Start: 2025-05-29 | End: 2025-05-30 | Stop reason: HOSPADM

## 2025-05-29 RX ORDER — ASPIRIN 81 MG/1
81 TABLET ORAL DAILY
Status: DISCONTINUED | OUTPATIENT
Start: 2025-05-29 | End: 2025-05-29

## 2025-05-29 RX ORDER — ACETAMINOPHEN 325 MG/10.15ML
650 LIQUID ORAL EVERY 4 HOURS PRN
Status: DISCONTINUED | OUTPATIENT
Start: 2025-05-29 | End: 2025-05-30 | Stop reason: HOSPADM

## 2025-05-29 RX ORDER — PROCHLORPERAZINE MALEATE 5 MG/1
5 TABLET ORAL EVERY 6 HOURS PRN
Status: DISCONTINUED | OUTPATIENT
Start: 2025-05-29 | End: 2025-05-30 | Stop reason: HOSPADM

## 2025-05-29 RX ORDER — ACETAMINOPHEN 325 MG/1
650 TABLET ORAL EVERY 4 HOURS PRN
Status: DISCONTINUED | OUTPATIENT
Start: 2025-05-29 | End: 2025-05-30 | Stop reason: HOSPADM

## 2025-05-29 RX ORDER — GADOBUTROL 604.72 MG/ML
7 INJECTION INTRAVENOUS ONCE
Status: COMPLETED | OUTPATIENT
Start: 2025-05-29 | End: 2025-05-29

## 2025-05-29 RX ORDER — ONDANSETRON 4 MG/1
4 TABLET, ORALLY DISINTEGRATING ORAL EVERY 6 HOURS PRN
Status: DISCONTINUED | OUTPATIENT
Start: 2025-05-29 | End: 2025-05-30 | Stop reason: HOSPADM

## 2025-05-29 RX ORDER — IOPAMIDOL 755 MG/ML
67 INJECTION, SOLUTION INTRAVASCULAR ONCE
Status: COMPLETED | OUTPATIENT
Start: 2025-05-29 | End: 2025-05-29

## 2025-05-29 RX ORDER — ONDANSETRON 2 MG/ML
4 INJECTION INTRAMUSCULAR; INTRAVENOUS EVERY 6 HOURS PRN
Status: DISCONTINUED | OUTPATIENT
Start: 2025-05-29 | End: 2025-05-30 | Stop reason: HOSPADM

## 2025-05-29 RX ORDER — THERA TABS 400 MCG
1 TAB ORAL DAILY
COMMUNITY

## 2025-05-29 RX ORDER — ATENOLOL 50 MG/1
100 TABLET ORAL DAILY
Status: DISCONTINUED | OUTPATIENT
Start: 2025-05-30 | End: 2025-05-30 | Stop reason: HOSPADM

## 2025-05-29 RX ORDER — LISINOPRIL AND HYDROCHLOROTHIAZIDE 20; 25 MG/1; MG/1
1 TABLET ORAL DAILY
COMMUNITY

## 2025-05-29 RX ORDER — ONDANSETRON 2 MG/ML
4 INJECTION INTRAMUSCULAR; INTRAVENOUS EVERY 30 MIN PRN
Status: DISCONTINUED | OUTPATIENT
Start: 2025-05-29 | End: 2025-05-29

## 2025-05-29 RX ORDER — LISINOPRIL AND HYDROCHLOROTHIAZIDE 20; 25 MG/1; MG/1
1 TABLET ORAL DAILY
Status: DISCONTINUED | OUTPATIENT
Start: 2025-05-29 | End: 2025-05-30 | Stop reason: HOSPADM

## 2025-05-29 RX ORDER — ASPIRIN 81 MG/1
81 TABLET ORAL DAILY
Status: DISCONTINUED | OUTPATIENT
Start: 2025-05-29 | End: 2025-05-30 | Stop reason: HOSPADM

## 2025-05-29 RX ORDER — HYDRALAZINE HYDROCHLORIDE 20 MG/ML
10-20 INJECTION INTRAMUSCULAR; INTRAVENOUS
Status: DISCONTINUED | OUTPATIENT
Start: 2025-05-29 | End: 2025-05-30 | Stop reason: HOSPADM

## 2025-05-29 RX ADMIN — ONDANSETRON 4 MG: 2 INJECTION, SOLUTION INTRAMUSCULAR; INTRAVENOUS at 05:13

## 2025-05-29 RX ADMIN — SODIUM CHLORIDE, SODIUM LACTATE, POTASSIUM CHLORIDE, AND CALCIUM CHLORIDE 1000 ML: .6; .31; .03; .02 INJECTION, SOLUTION INTRAVENOUS at 05:14

## 2025-05-29 RX ADMIN — ASPIRIN 81 MG: 81 TABLET, COATED ORAL at 13:24

## 2025-05-29 RX ADMIN — POTASSIUM CHLORIDE 40 MEQ: 1500 TABLET, EXTENDED RELEASE ORAL at 18:12

## 2025-05-29 RX ADMIN — IOPAMIDOL 67 ML: 755 INJECTION, SOLUTION INTRAVENOUS at 05:26

## 2025-05-29 RX ADMIN — GADOBUTROL 7 ML: 604.72 INJECTION INTRAVENOUS at 11:53

## 2025-05-29 RX ADMIN — SODIUM CHLORIDE 100 ML: 9 INJECTION, SOLUTION INTRAVENOUS at 05:26

## 2025-05-29 ASSESSMENT — ACTIVITIES OF DAILY LIVING (ADL)
ADLS_ACUITY_SCORE: 47
ADLS_ACUITY_SCORE: 50
ADLS_ACUITY_SCORE: 47
ADLS_ACUITY_SCORE: 50
ADLS_ACUITY_SCORE: 50
ADLS_ACUITY_SCORE: 47
ADLS_ACUITY_SCORE: 47
ADLS_ACUITY_SCORE: 50
ADLS_ACUITY_SCORE: 47
ADLS_ACUITY_SCORE: 49
ADLS_ACUITY_SCORE: 47
ADLS_ACUITY_SCORE: 47
ADLS_ACUITY_SCORE: 50
ADLS_ACUITY_SCORE: 50
ADLS_ACUITY_SCORE: 47

## 2025-05-29 ASSESSMENT — COLUMBIA-SUICIDE SEVERITY RATING SCALE - C-SSRS
1. IN THE PAST MONTH, HAVE YOU WISHED YOU WERE DEAD OR WISHED YOU COULD GO TO SLEEP AND NOT WAKE UP?: NO
2. HAVE YOU ACTUALLY HAD ANY THOUGHTS OF KILLING YOURSELF IN THE PAST MONTH?: NO
6. HAVE YOU EVER DONE ANYTHING, STARTED TO DO ANYTHING, OR PREPARED TO DO ANYTHING TO END YOUR LIFE?: NO

## 2025-05-29 NOTE — H&P
[facility]    History and Physical - Hospitalist Service       Date of Admission:  5/29/2025    Assessment & Plan   aNn Sanford is a 85 year old female with past medical history significant for hypertension who presents with left facial droop and left hand numbness/weakness. Admitted on 5/29/2025.     TIA   Left ICA aneurysm   *Presents with left facial droop and left hand numbness/weakness. Left hand symptoms lasted several hours and resolved, left facial droop persisted longer    *Head CT no acute abnormality.   *CTA head with no large vessel occlusion or flow-limiting stenosis, 3 mm aneurysm arising in the supraclinoid segment of the distal left internal carotid artery.  *CTA neck no measurable stenosis or dissection  *MRI brain negative for stroke   - Neurology (stroke) consulted   - Echocardiogram   - Telemetry  - HgbA1c, lipid panel  - PT/OT/SLP deferred as  back to baseline   - NPO pending bedside RN swallow evaluation, conditional diet ordered  - Aspirin 81 mg daily per neurology   - Atorvastatin 40 mg on discharge per neurology (hold while observation)  - Permissive hypertension, goal SBP <220; PRN anti-hypertensives ordered for severe elevations     Left calvarium lesion  Noted incidentally on MRI, possible atypical hemangioma. Hx of benign breast tumor, otherwise no known malignancy.   - Neurology consulted as above    Hypertension  - Hold prior to admission lisinopril-hydrochlorothiazide for permissive hypertension as above  - Resume prior to admission atenolol 5/30/25     Hyponatremia, mild  Hypokalemia  *Sodium 133. PTA on hydrochlorothiazide, labs historically normal on this. Reports decreased intake over the past few days.   *Received 1L LR in ED  - Monitor and replace potassium per protocol   - Encourage PO intake  - BMP in AM   - Discussed rechecking BMP as outpatient with normal intake, consider change from hydrochlorothiazide if recurrent hypokalemia/hyponatremia      Subclinical hyperthyroidism  Not on medications PTA  - Noted        Diet: Combination Diet Regular Diet   DVT Prophylaxis: Pneumatic Compression Devices  Perez Catheter: Not present  Code Status: Full Code     Disposition Plan   [place] to observation status.     Medically Ready for Discharge: Anticipated Tomorrow       Entered: Santos Mayfield MD 05/29/2025, 5:21 PM     The patient's care was discussed with the ED provider, patient and patient's son     Clinically Significant Risk Factors Present on Admission        # Hypokalemia: Lowest K = 3.1 mmol/L in last 2 days, will replace as needed  # Hyponatremia: Lowest Na = 133 mmol/L in last 2 days, will monitor as appropriate  # Hypochloremia: Lowest Cl = 93 mmol/L in last 2 days, will monitor as appropriate          # Hypertension: Home medication list includes antihypertensive(s)           # Obesity: Estimated body mass index is 31.39 kg/m  as calculated from the following:    Height as of this encounter: 1.524 m (5').    Weight as of this encounter: 72.9 kg (160 lb 11.5 oz).                   Santos Mayfield MD  [facility]    ______________________________________________________________________    Chief Complaint   Left facial droop, left hand numbness/weakness    History of Present Illness   Nan Sanford is a 85 year old female who presents with the above chief complaint.    History is obtained from the patient, discussion with ED provider and review of medical record. The patient reports at around 5 PM on 5/24 she took a shower and when she got out she noted the left side of her face was droopy and numb.  She additionally notes some mild weakness and numbness in her left hand.  She reports she otherwise felt well, so decided to go to bed assuming her symptoms were improved.  When she awoke the next morning her left hand symptoms had resolved, however she continued to note a left facial droop.  She felt tired with decreased  intake over the next few days, her left facial droop improved but she can not say with certainty when it completely resolved but generally felt better on 5/28 and was able to do her baseline activities. Early 5/29 AM she awoke feeling nauseated without vomiting and increased generalized weakness, which prompted her to seek care. She denies any diarrhea, urinary symptoms.     In the Emergency Department, the patient was seen initially by Dr. España followed by Dr. Atkins, with whom I discussed the patient's presenting symptoms and emergency department course.  Initial vital signs were a temperature of 97.3F, HR 63, /76, RR 18, SpO2 96% on room air. Pertinent work-up as noted in A&P. Hospitalists were contacted for admission to the hospital.     Past Medical History    I have reviewed this patient's medical history and updated it with pertinent information if needed.   Past Medical History:   Diagnosis Date    Hypertension     Ovarian cyst     Ruptured    Polymyalgia rheumatica        Past Surgical History   I have reviewed this patient's surgical history and updated it with pertinent information if needed.  Past Surgical History:   Procedure Laterality Date    BREAST SURGERY      ENT SURGERY      GYN SURGERY      ORTHOPEDIC SURGERY           Social History   I have reviewed this patient's social history and updated it with pertinent information if needed.  Social History     Tobacco Use    Smoking status: Never   Substance Use Topics    Alcohol use: No    Drug use: No        Prior to Admission Medications    Prior to Admission Medications   Prescriptions Last Dose Informant Patient Reported? Taking?   Cholecalciferol (VITAMIN D3 PO) 5/28/2025 Self Yes Yes   Sig: Take 5,000 Units by mouth daily   acetaminophen (TYLENOL) 500 MG tablet  Self Yes Yes   Sig: Take 500-1,000 mg by mouth every 6 hours as needed for mild pain   atenolol (TENORMIN) 50 MG tablet 5/28/2025 Self Yes Yes   Sig: Take 100 mg by mouth  daily   calcium-vitamin D (CALTRATE) 600-400 MG-UNIT per tablet 5/28/2025 Self Yes Yes   Sig: Take 1 tablet by mouth daily   lisinopril-hydrochlorothiazide (ZESTORETIC) 20-25 MG tablet 5/28/2025  Yes Yes   Sig: Take 1 tablet by mouth daily.   multivitamin, therapeutic (THERA-VIT) TABS tablet 5/28/2025  Yes Yes   Sig: Take 1 tablet by mouth daily.      Facility-Administered Medications: None     Allergies   No Known Allergies    Physical Exam   Vital Signs: Temp: 97.4  F (36.3  C) Temp src: Oral BP: (!) 151/70 Pulse: 75   Resp: 18 SpO2: 94 % O2 Device: None (Room air)    Weight: 160 lbs 11.45 oz    Constitutional: Well-appearing, NAD  Eyes: PERRL, EOMI  Respiratory: Clear to auscultation bilaterally, good air movement, normal effort   Cardiovascular: RRR, no m/r/g.    GI: Soft, non-tender, non-distended. No rebound tenderness or guarding.    Skin: Warm, dry   Neurologic: Alert. Responding to questions appropriately. Following commands.  Oriented to person, place and time. Face symmetric. Tongue midline. 5/5 upper extremity strength symmetric bilaterally, slight limitation on exam of left leg due to chronic knee pain/prior surgery. Intact finger-nose-finger testing symmetric bilaterally. Intact heal-shin testing symmetric bilaterally.    Psychiatric: Normal affect, appropriate      Data   Data reviewed today: I reviewed all medications, new labs and imaging results over the last 24 hours. I personally reviewed the EKG tracing showing sinus rhythm.    Recent Labs   Lab 05/29/25  0346   WBC 6.6   HGB 13.8   MCV 90      *   POTASSIUM 3.1*   CHLORIDE 93*   CO2 25   BUN 25.9*   CR 0.76   ANIONGAP 15   BRENNEN 9.4   *   ALBUMIN 3.8   PROTTOTAL 6.6   BILITOTAL 0.4   ALKPHOS 83   ALT 16   AST 23       Recent Results (from the past 24 hours)   Head CT w/o contrast    Narrative    EXAM: CTA HEAD NECK W CONTRAST, CT HEAD W/O CONTRAST  LOCATION: [facility]  DATE: 5/29/2025    INDICATION:  Weakness, left-sided facial droop 5 days ago  COMPARISON: 2/16/2024  CONTRAST: 67 mL Isovue   370  TECHNIQUE: Head and neck CT angiogram with IV contrast. Noncontrast head CT followed by axial helical CT images of the head and neck vessels obtained during the arterial phase of intravenous contrast administration. Axial 2D reconstructed images and   multiplanar 3D MIP reconstructed images of the head and neck vessels were performed by the technologist. Dose reduction techniques were used. All stenosis measurements made according to NASCET criteria unless otherwise specified.    FINDINGS:   NONCONTRAST HEAD CT:   INTRACRANIAL CONTENTS: No intracranial hemorrhage, extraaxial collection, or mass effect.  No CT evidence of acute infarct. Mild presumed chronic small vessel ischemic changes. Mild to moderate generalized volume loss. No hydrocephalus.     VISUALIZED ORBITS/SINUSES/MASTOIDS: No intraorbital abnormality. No paranasal sinus mucosal disease. No middle ear or mastoid effusion.    BONES/SOFT TISSUES: No acute abnormality.    HEAD CTA:  ANTERIOR CIRCULATION: No large vessel occlusion or flow-limiting stenosis. There is a 3 mm aneurysm arising in the supraclinoid segment of the distal left internal carotid artery which projects inferiorly. Standard [place] anatomy.    POSTERIOR CIRCULATION: No stenosis/occlusion, aneurysm, or high flow vascular malformation. Dominant left and smaller right vertebral artery contribute to a normal basilar artery.     DURAL VENOUS SINUSES: Expected enhancement of the major dural venous sinuses.    NECK CTA:  RIGHT CAROTID: No measurable stenosis or dissection.    LEFT CAROTID: No mild atheromatous change. Measurable stenosis or dissection.    VERTEBRAL ARTERIES: No focal stenosis or dissection. Dominant left and smaller right vertebral arteries.    AORTIC ARCH: Bovine origin left common carotid artery. No significant stenosis at the origin of the great vessels.    NONVASCULAR  STRUCTURES: Multinodular goiter.      Impression    IMPRESSION:   HEAD CT:  1.  No CT evidence for acute intracranial process.  2.  Brain atrophy and presumed chronic microvascular ischemic changes as above.    HEAD CTA:  1.  No large vessel occlusion or flow-limiting stenosis.  2.  3 mm aneurysm arising in the supraclinoid segment of the distal left internal carotid artery.    NECK CTA:  No measurable stenosis or dissection.                CTA Head Neck with Contrast    Narrative    EXAM: CTA HEAD NECK W CONTRAST, CT HEAD W/O CONTRAST  LOCATION: [facility]  DATE: 5/29/2025    INDICATION: Weakness, left-sided facial droop 5 days ago  COMPARISON: 2/16/2024  CONTRAST: 67 mL Isovue   370  TECHNIQUE: Head and neck CT angiogram with IV contrast. Noncontrast head CT followed by axial helical CT images of the head and neck vessels obtained during the arterial phase of intravenous contrast administration. Axial 2D reconstructed images and   multiplanar 3D MIP reconstructed images of the head and neck vessels were performed by the technologist. Dose reduction techniques were used. All stenosis measurements made according to NASCET criteria unless otherwise specified.    FINDINGS:   NONCONTRAST HEAD CT:   INTRACRANIAL CONTENTS: No intracranial hemorrhage, extraaxial collection, or mass effect.  No CT evidence of acute infarct. Mild presumed chronic small vessel ischemic changes. Mild to moderate generalized volume loss. No hydrocephalus.     VISUALIZED ORBITS/SINUSES/MASTOIDS: No intraorbital abnormality. No paranasal sinus mucosal disease. No middle ear or mastoid effusion.    BONES/SOFT TISSUES: No acute abnormality.    HEAD CTA:  ANTERIOR CIRCULATION: No large vessel occlusion or flow-limiting stenosis. There is a 3 mm aneurysm arising in the supraclinoid segment of the distal left internal carotid artery which projects inferiorly. Standard [place] of [place] anatomy.    POSTERIOR CIRCULATION: No  stenosis/occlusion, aneurysm, or high flow vascular malformation. Dominant left and smaller right vertebral artery contribute to a normal basilar artery.     DURAL VENOUS SINUSES: Expected enhancement of the major dural venous sinuses.    NECK CTA:  RIGHT CAROTID: No measurable stenosis or dissection.    LEFT CAROTID: No mild atheromatous change. Measurable stenosis or dissection.    VERTEBRAL ARTERIES: No focal stenosis or dissection. Dominant left and smaller right vertebral arteries.    AORTIC ARCH: Bovine origin left common carotid artery. No significant stenosis at the origin of the great vessels.    NONVASCULAR STRUCTURES: Multinodular goiter.      Impression    IMPRESSION:   HEAD CT:  1.  No CT evidence for acute intracranial process.  2.  Brain atrophy and presumed chronic microvascular ischemic changes as above.    HEAD CTA:  1.  No large vessel occlusion or flow-limiting stenosis.  2.  3 mm aneurysm arising in the supraclinoid segment of the distal left internal carotid artery.    NECK CTA:  No measurable stenosis or dissection.                MR Brain w/o & w Contrast    Narrative    EXAM: MR BRAIN W/O and W CONTRAST  LOCATION: [facility]  DATE: 5/29/2025    INDICATION: left facial droop  COMPARISON: CT in CTA from earlier same-day.  CONTRAST: 7 mL GADAVIST  TECHNIQUE: Routine multiplanar multisequence head MRI without and with intravenous contrast.    FINDINGS:  INTRACRANIAL CONTENTS: No acute or subacute infarct. No mass, acute hemorrhage, or extra-axial fluid collections. Scattered nonspecific T2/FLAIR hyperintensities within the cerebral white matter most consistent with mild chronic microvascular ischemic   change. Mild generalized cerebral volume loss. Normal ventricles and sulci. Normal position of the cerebellar tonsils. No pathologic contrast enhancement.    SELLA: No abnormality accounting for technique.    OSSEOUS STRUCTURES/SOFT TISSUES: 1.4 cm T2 hyperintense, T1  hypointense enhancing lesion in the left frontal calvarium. This is indeterminant and may represent an atypical hemangioma. The major intracranial vascular flow voids are maintained. Midline   cystic scalp lesions, likely representing inclusion cysts.    ORBITS: Prior bilateral cataract surgery. Visualized portions of the orbits are otherwise unremarkable.     SINUSES/MASTOIDS: No paranasal sinus mucosal disease. No middle ear or mastoid effusion.       Impression    IMPRESSION:  1.  No acute intracranial pathology.  2.  Mild chronic small vessel ischemic disease and generalized cerebral volume loss.  3.  1.4 cm T2 hyperintense, T1 hypointense enhancing lesion in the left frontal calvarium. This is indeterminant and may represent an atypical hemangioma.

## 2025-05-29 NOTE — ED NOTES
[facility]  ED Nurse Handoff Report    ED Chief complaint: Generalized Weakness      ED Diagnosis:   Final diagnoses:   Generalized weakness   TIA (transient ischemic attack)       Code Status: to be addressed by MD    Allergies: No Known Allergies    Patient Story: Patient arrived via ems. She is here with weakness and nausea.she stated she might have had a stroke Saturday. She stated she had had left facial droop which lasted to tuesday     Focused Assessment:  No neuro deficits, No facial droop present. Pt does feel weaker than normal, saying she did not really sleep last night, and has not eaten since yesterday. Pt denies pain or nausea at this time.    Treatments and/or interventions provided: Aspirin, Zofran, IV fluids, labs, imaging    MR Brain w/o & w Contrast   Final Result   IMPRESSION:   1.  No acute intracranial pathology.   2.  Mild chronic small vessel ischemic disease and generalized cerebral volume loss.   3.  1.4 cm T2 hyperintense, T1 hypointense enhancing lesion in the left frontal calvarium. This is indeterminant and may represent an atypical hemangioma.         Head CT w/o contrast   Final Result   IMPRESSION:    HEAD CT:   1.  No CT evidence for acute intracranial process.   2.  Brain atrophy and presumed chronic microvascular ischemic changes as above.      HEAD CTA:   1.  No large vessel occlusion or flow-limiting stenosis.   2.  3 mm aneurysm arising in the supraclinoid segment of the distal left internal carotid artery.      NECK CTA:   No measurable stenosis or dissection.                        CTA Head Neck with Contrast   Final Result   IMPRESSION:    HEAD CT:   1.  No CT evidence for acute intracranial process.   2.  Brain atrophy and presumed chronic microvascular ischemic changes as above.      HEAD CTA:   1.  No large vessel occlusion or flow-limiting stenosis.   2.  3 mm aneurysm arising in the supraclinoid segment of the distal left internal carotid artery.       NECK CTA:   No measurable stenosis or dissection.                          Labs Ordered and Resulted from Time of ED Arrival to Time of ED Departure   COMPREHENSIVE METABOLIC PANEL - Abnormal       Result Value    Sodium 133 (*)     Potassium 3.1 (*)     Carbon Dioxide (CO2) 25      Anion Gap 15      Urea Nitrogen 25.9 (*)     Creatinine 0.76      GFR Estimate 77      Calcium 9.4      Chloride 93 (*)     Glucose 100 (*)     Alkaline Phosphatase 83      AST 23      ALT 16      Protein Total 6.6      Albumin 3.8      Bilirubin Total 0.4     UA MACROSCOPIC WITH REFLEX TO MICRO AND CULTURE - Abnormal    Color Urine Light Yellow      Appearance Urine Clear      Glucose Urine Negative      Bilirubin Urine Negative      Ketones Urine Trace (*)     Specific Gravity Urine 1.024      Blood Urine Negative      pH Urine 7.0      Protein Albumin Urine Negative      Urobilinogen Urine Normal      Nitrite Urine Negative      Leukocyte Esterase Urine Trace (*)     RBC Urine 0      WBC Urine 2      Squamous Epithelials Urine 1      Hyaline Casts Urine 2     TROPONIN T, HIGH SENSITIVITY - Normal    Troponin T, High Sensitivity 11     TROPONIN T, HIGH SENSITIVITY - Normal    Troponin T, High Sensitivity 11     CBC WITH PLATELETS AND DIFFERENTIAL    WBC Count 6.6      RBC Count 4.32      Hemoglobin 13.8      Hematocrit 38.7      MCV 90      MCH 31.9      MCHC 35.7      RDW 11.8      Platelet Count 210      % Neutrophils 47      % Lymphocytes 40      % Monocytes 12      % Eosinophils 1      % Basophils 1      % Immature Granulocytes 0      NRBCs per 100 WBC 0      Absolute Neutrophils 3.1      Absolute Lymphocytes 2.6      Absolute Monocytes 0.8      Absolute Eosinophils 0.1      Absolute Basophils 0.0      Absolute Immature Granulocytes 0.0      Absolute NRBCs 0.0     TROPONIN T, HIGH SENSITIVITY       Patient's response to treatments and/or interventions: improved    To be done/followed up on inpatient unit:  IP orders    Does this  patient have any cognitive concerns?: none    Activity level - Baseline/Home:  Independent  Activity Level - Current:   pt able to ambulate independently, but gait slightly unsteady so pt more comfortable having staff member walk beside her when ambulating in hallway to the bathroom    Patient's Preferred language: English   Needed?: No    Isolation: None  Infection: Not Applicable  Sepsis treatment initiated: No  Patient tested for COVID 19 prior to admission: NO  Bariatric?: No    Vital Signs:   Vitals:    05/29/25 1159 05/29/25 1201 05/29/25 1230 05/29/25 1300   BP: (!) 167/69      Pulse: 66      Resp:  18     Temp:       TempSrc:       SpO2:  97% 96% 97%   Weight:       Height:           Cardiac Rhythm:   ECG results from 05/29/25   EKG 12-lead, tracing only     Value    Systolic Blood Pressure     Diastolic Blood Pressure     Ventricular Rate 59    Atrial Rate 59    AK Interval 204    QRS Duration 78        QTc 455    P Axis 75    R AXIS 47    T Axis 47    Interpretation ECG      Sinus bradycardia  Otherwise normal ECG  When compared with ECG of 16-Feb-2024 09:46,  T wave amplitude has decreased in Anterior leads  Confirmed by GENERATED REPORT, COMPUTER ([id number]),  Mona Lopez ([id number]) on 5/29/2025 11:31:33 AM         Was the PSS-3 completed:   Yes  What interventions are required if any?               Family Comments: son supportive at bedside    For the majority of the shift this patient's behavior was Green.     ED NURSE PHONE NUMBER: ([phone])

## 2025-05-29 NOTE — ED TRIAGE NOTES
Patient arrived via ems. She is here with weakness and nausea.she stated she might have had a stroke Saturday. She stated she had had left facial droop which lasted to tuesday     Triage Assessment (Adult)       Row Name 05/29/25 0332          Triage Assessment    Airway WDL WDL        Respiratory WDL    Respiratory WDL WDL        Skin Circulation/Temperature WDL    Skin Circulation/Temperature WDL WDL        Cardiac WDL    Cardiac WDL WDL        Peripheral/Neurovascular WDL    Peripheral Neurovascular WDL WDL        Cognitive/Neuro/Behavioral WDL    Cognitive/Neuro/Behavioral WDL WDL

## 2025-05-29 NOTE — ED NOTES
"  [facility]    Stroke Telephone Note    I was called by Kem España on 05/29/25 regarding patient Nan Sanford. The patient is a 85 year old female with past medical history of HTN presents with Left facial droop and numbness since Saturday which was transient in nature.  Today she comes back due to generalized weakness, feeling unwell and ambulatory dysfunction.         Vitals  BP: (!) 172/71   Pulse: 63   Resp: 16   Temp: 97.3  F (36.3  C)   Weight: 68 kg (150 lb)    Imaging Findings  HEAD CT:  1.  No CT evidence for acute intracranial process.  2.  Brain atrophy and presumed chronic microvascular ischemic changes as above.     HEAD CTA:  1.  No large vessel occlusion or flow-limiting stenosis.  2.  3 mm aneurysm arising in the supraclinoid segment of the distal left internal carotid artery.     NECK CTA:  No measurable stenosis or dissection.      MRI brain was neg for any acute ischemic stroke    Impression    Transient Left facial droop and numbness possible TIA  L ICA aneurysm       Recommendations  - Place Neurology IP Stroke Consult order   - Neurochecks and Vital Signs every 4 hrs   - Permissive HTN; goal SBP < 220 mmHg  - Daily aspirin 81 mg for secondary stroke prevention  - Statin: Atorvastatin 40 mg qd  - Telemetry, EKG  - Bedside Glucose Monitoring  - A1c, Lipid Panel, Troponin x 3  - PT/OT/SLP  - Stroke Education  - Euthermia, Euglycemia  - TTE  - NeuroIR follow up as outpt for aneurysm     My recommendations are based on the information provided over the phone by Nan Sanford's in-person providers. They are not intended to replace the clinical judgment of her in-person providers. I was not requested to personally see or examine the patient at this time.     The Stroke Staff is Dr. Noe.    Sudhir Mccormick MD  Vascular Neurology Fellow    To page me or covering stroke neurology team member, click here: [email or web address]  Choose \"On Call\" tab at top, then select " "\"NEUROLOGY/ALL SITES\" from middle drop-down box, press Enter, then look for \"stroke\" or \"telestroke\" for your site.           "

## 2025-05-29 NOTE — PHARMACY-ADMISSION MEDICATION HISTORY
Pharmacist Admission Medication History    Admission medication history is complete. The information provided in this note is only as accurate as the sources available at the time of the update.    Information Source(s): Patient and CareEverywhere/SureScripts via in-person    Pertinent Information: None    Changes made to PTA medication list:  Added: Lisinopril/hydrochlorothiazide, Multivitamin  Deleted: Fosamax - per pt - never took this, Methotrexate - per pt - never took this, meclizine - does not take, folic acid - does not take  Changed: None    Allergies reviewed with patient and updates made in EHR: no    Medication History Completed By: Lola Esparza [facility] 5/29/2025 2:23 PM    PTA Med List   Medication Sig Last Dose/Taking    acetaminophen (TYLENOL) 500 MG tablet Take 500-1,000 mg by mouth every 6 hours as needed for mild pain Taking As Needed    atenolol (TENORMIN) 50 MG tablet Take 100 mg by mouth daily 5/28/2025    calcium-vitamin D (CALTRATE) 600-400 MG-UNIT per tablet Take 1 tablet by mouth daily 5/28/2025    Cholecalciferol (VITAMIN D3 PO) Take 5,000 Units by mouth daily 5/28/2025    lisinopril-hydrochlorothiazide (ZESTORETIC) 20-25 MG tablet Take 1 tablet by mouth daily. 5/28/2025    multivitamin, therapeutic (THERA-VIT) TABS tablet Take 1 tablet by mouth daily. 5/28/2025

## 2025-05-29 NOTE — PROGRESS NOTES
[id number]  Primary Dx:  TIA, Cerebral aneurysm, non ruptured    Orientation: A&O x4  Vitals/Pain: Negative for initial orthostatic check. VSS, RA. Denies pain  Tele: SR  Neuros: Intact, general weakness, LLE weak, Hx of TKA.   Resp: LS clear  GI/: Continent of B/B. LBM: 05/28  Skin: Intact, scattered bruising  Activity: SBA with gb  Diet: Regular, pass bedside swallow test   Abnormal labs: K: 3.1, replaced    Plan:  OBS status. MRI neg for stroke, Neuro stoke to consult, ECHO ordered, GERMAN possible.

## 2025-05-29 NOTE — ED PROVIDER NOTES
Spoke to stroke neurology after MRI resulted.  Patient still asymptomatic, ambulated in ED without assistance.  Stroke neurology recommended continuation of baby aspirin and admission for further TIA workup.  Contacted hospitalist who is in agreement.     Ian Atkins MD  05/29/25 [id number]

## 2025-05-29 NOTE — ED PROVIDER NOTES
Emergency Department Note      History of Present Illness     Chief Complaint   Generalized Weakness      HPI   Nan Sanford is a 85 year old female with a history of hypertension, who presents to the emergency department today via EMS for evaluation of generalized weakness. The patient reports on 05/24/25 she had showered, and then looked in the mirror and noticed left sided facial drooping, and numbness on that side as well. This did not present with any one sided weakness or numbness. Nan then called the nurse line who told her to go to the ED, however Nan felt fine and chose not to present to the ED at the time. reports the drooping persisted until 05/27/25, but improved slightly each day. Then this morning around 0230, Nan report she woke up shakey, nauseous, feeling like she needed to pass diarrhea, and that she couldn't walk. This caused her to feel like she was going to faint. She notes she doesn't have a headache, but can hear her heart beating in her ears, as well pressure in her head. Nan reports she has a blood pressure issues that fluctuates even when she takes her medications, and that she took an extra third of her blood pressure medications this morning. Nan denies any headaches, coughs, vomiting, diarrhea, one sided weakness or numbness, or faltering speech. She notes having a history of two small aneurysms in the right side of her head, and has been taking aspirin since 05/24/25.    Independent Historian   None    Review of External Notes   I reviewed family medicine note from 05/2/25 regarding patient's presentation for hypertension     Past Medical History     Medical History and Problem List   Pseudophakia, bilateral  Plantar fasciitis, right  Sebaceous cyst  Glaucoma suspect, bilateral  Adenomatous polyp of colon  Endometrial polyp  Vitamin D deficiency  Osteoarthritis  Anxiety  Hypertension  Osteoporosis, postmenopausal  Allergic rhinitis  Obesity  Sacral pain  Polymyalgia  rheumatica     Medications   Caltrate  Calcium carbonate-vitamin D  Ipratropium  Atenolol  Lisinopril-hydrochlorothiazide     Surgical History   Scope knee  Tonsillectomy  Fascet part palmar w/ rel 1 digit  Hysteroscopy  Breast biopsy (x2)  Extracapsular cataract (bilateral)  Reduction mammaplasty (bilateral)    Physical Exam     Patient Vitals for the past 24 hrs:   BP Temp Temp src Pulse Resp SpO2 Height Weight   05/29/25 0810 -- -- -- -- -- -- 1.524 m (5') 68 kg (150 lb)   05/29/25 0800 (!) 144/67 -- -- -- 16 97 % -- --   05/29/25 0700 (!) 165/63 -- -- 62 -- 97 % -- --   05/29/25 0430 (!) 184/73 -- -- 66 -- 96 % -- --   05/29/25 0331 (!) 179/76 97.3  F (36.3  C) Oral 63 18 96 % -- --     Physical Exam  General: Does not appear in acute distress  Head: No signs of trauma.   Mouth/Throat: Oropharynx is clear and moist.   Eyes: Conjunctivae are normal.   Neck: Normal range of motion. No nuchal rigidity.   CV: Normal rate and regular rhythm.    Resp: Effort normal and breath sounds normal. No respiratory distress.   GI: Soft. There is no tenderness.  No rebound or guarding.  Normal bowel sounds.  No CVA tenderness.  MSK: Normal range of motion. no edema. No Calf tenderness.  Neuro: The patient is alert and oriented to person, place, and time.  PERRLA, EOMI, strength in upper extremities normal, but slightly tremulous bilaterally and symmetrical. Able to do straight leg raise bilaterally, but slightly more effort with left leg compared to right.  Sensation normal. Speech normal.  Slight asymmetry at corner of left mouth compared to right, but no clear droop. Remainder of cranial nerve exam reassuring  Ambulatory.    Skin: Skin is warm and dry. No rash noted.   Psych: normal mood and affect. behavior is normal.       Diagnostics     Lab Results   Labs Ordered and Resulted from Time of ED Arrival to Time of ED Departure   COMPREHENSIVE METABOLIC PANEL - Abnormal       Result Value    Sodium 133 (*)     Potassium 3.1 (*)      Carbon Dioxide (CO2) 25      Anion Gap 15      Urea Nitrogen 25.9 (*)     Creatinine 0.76      GFR Estimate 77      Calcium 9.4      Chloride 93 (*)     Glucose 100 (*)     Alkaline Phosphatase 83      AST 23      ALT 16      Protein Total 6.6      Albumin 3.8      Bilirubin Total 0.4     UA MACROSCOPIC WITH REFLEX TO MICRO AND CULTURE - Abnormal    Color Urine Light Yellow      Appearance Urine Clear      Glucose Urine Negative      Bilirubin Urine Negative      Ketones Urine Trace (*)     Specific Gravity Urine 1.024      Blood Urine Negative      pH Urine 7.0      Protein Albumin Urine Negative      Urobilinogen Urine Normal      Nitrite Urine Negative      Leukocyte Esterase Urine Trace (*)     RBC Urine 0      WBC Urine 2      Squamous Epithelials Urine 1      Hyaline Casts Urine 2     TROPONIN T, HIGH SENSITIVITY - Normal    Troponin T, High Sensitivity 11     TROPONIN T, HIGH SENSITIVITY - Normal    Troponin T, High Sensitivity 11     CBC WITH PLATELETS AND DIFFERENTIAL    WBC Count 6.6      RBC Count 4.32      Hemoglobin 13.8      Hematocrit 38.7      MCV 90      MCH 31.9      MCHC 35.7      RDW 11.8      Platelet Count 210      % Neutrophils 47      % Lymphocytes 40      % Monocytes 12      % Eosinophils 1      % Basophils 1      % Immature Granulocytes 0      NRBCs per 100 WBC 0      Absolute Neutrophils 3.1      Absolute Lymphocytes 2.6      Absolute Monocytes 0.8      Absolute Eosinophils 0.1      Absolute Basophils 0.0      Absolute Immature Granulocytes 0.0      Absolute NRBCs 0.0     TROPONIN T, HIGH SENSITIVITY     Imaging   Head CT w/o contrast   Final Result   IMPRESSION:    HEAD CT:   1.  No CT evidence for acute intracranial process.   2.  Brain atrophy and presumed chronic microvascular ischemic changes as above.      HEAD CTA:   1.  No large vessel occlusion or flow-limiting stenosis.   2.  3 mm aneurysm arising in the supraclinoid segment of the distal left internal carotid artery.       NECK CTA:   No measurable stenosis or dissection.                        CTA Head Neck with Contrast   Final Result   IMPRESSION:    HEAD CT:   1.  No CT evidence for acute intracranial process.   2.  Brain atrophy and presumed chronic microvascular ischemic changes as above.      HEAD CTA:   1.  No large vessel occlusion or flow-limiting stenosis.   2.  3 mm aneurysm arising in the supraclinoid segment of the distal left internal carotid artery.      NECK CTA:   No measurable stenosis or dissection.                        MR Brain w/o & w Contrast    (Results Pending)     EKG   ECG results from 05/29/25   EKG 12-lead, tracing only     Value    Systolic Blood Pressure     Diastolic Blood Pressure     Ventricular Rate 59    Atrial Rate 59    SC Interval 204    QRS Duration 78        QTc 455    P Axis 75    R AXIS 47    T Axis 47    Interpretation ECG      Sinus bradycardia  Otherwise normal ECG  When compared with ECG of 16-Feb-2024 09:46,  T wave amplitude has decreased in Anterior leads          Independent Interpretation   On my independent interpretation of head CT there is no large ICH noted     ED Course      Medications Administered   Medications   ondansetron (ZOFRAN) injection 4 mg (4 mg Intravenous $Given 5/29/25 0513)   lactated ringers BOLUS 1,000 mL (0 mLs Intravenous Stopped 5/29/25 0726)   iopamidol (ISOVUE-370) solution 67 mL (67 mLs Intravenous $Given 5/29/25 0526)   sodium chloride 0.9 % bag for CT scan flush (100 mLs Intravenous $Given 5/29/25 0526)       Procedures   Procedures     Discussion of Management   Stroke neurology regarding pt's symptoms.    ED Course   ED Course as of 05/29/25 0843   Thu May 29, 2025   0428 I obtained history and examined the patient as noted above.         Additional Documentation  None    Medical Decision Making / Diagnosis     CMS Diagnoses: The patient has stroke symptoms:         ED Stroke specific documentation           NIHSS PDF     Patient last known  well time: 5/24/25      Thrombolytics:   Not given due to:   - unclear or unfavorable risk-benefit profile for extended window thrombolysis beyond the conventional 4.5 hour time window    If treating with thrombolytics: Ensure SBP<180 and DBP<105 prior to treatment with thrombolytics.  Administering thrombolytics after treatment with IV labetalol, hydralazine, or nicardipine is reasonable once BP control is established.    Endovascular Retrieval:  Not initiated due to absence of proximal vessel occlusion    [employer] Stroke Scale (Baseline)  Time Performed: Inital     Score    Level of consciousness: (0)   Alert, keenly responsive    LOC questions: (0)   Answers both questions correctly    LOC commands: (0)   Performs both tasks correctly    Best gaze: (0)   Normal    Visual: (0)   No visual loss    Facial palsy: (1)   Minor paralysis (flat nasolabial fold, smile asymmetry)    Motor arm (left): (0)   No drift    Motor arm (right): (0)   No drift    Motor leg (left): (0)   No drift    Motor leg (right): (0)   No drift    Limb ataxia: (0)   Absent    Sensory: (0)   Normal- no sensory loss    Best language: (0)   Normal- no aphasia    Dysarthria: (0)   Normal    Extinction and inattention: (0)   No abnormality        Total Score:  1        Stroke Mimics were considered (including migraine headache, seizure disorder, hypoglycemia (or hyperglycemia), head or spinal trauma, CNS infection, Toxin ingestion and shock state (e.g. sepsis) .          MIPS   None    MDM   Nan Sanford is a 85 year old female presents due to generalized weakness.  She reports that after a shower in the evening of 5/24/2025 she noted drooping of the left side of her mouth and some numbness.  She did not notice any other symptoms and did not seek medical evaluation.  She reports that the symptoms have gradually improved.  She woke up in the middle of the night tonight feeling generally weak all over and somewhat shaky and  nauseous and given the recent facial droop decided to come to the hospital.  On my evaluation she was slightly shaky with arm extension bilaterally, but this was equal.  It did seem that she had slightly more difficult time doing a straight leg raise with her left leg compared to the right, but she was able to.  In speaking with the patient's son, there was a very slight asymmetry at the corner of the left mouth compared to the right from baseline, but this was very minimal.  No other clear focal neurologic deficits were noted.  I did not call a code stroke given the original symptoms being multiple days ago.  I did obtain CT CTA which did not show any clear acute process.  It did note a small aneurysm of the left internal carotid artery.  Repeat evaluation the patient actually reports that she is overall feeling better.  She has been able to stand and ambulate.  EKG blood work, urine were reassuring.  I did speak with stroke neurology who recommended obtaining the MRI of the brain as well as this would help determine disposition.  Patient was signed out with the MRI and repeat assessment pending.    Disposition   Care of the patient was transferred to my colleague Dr. Atkins pending MRI.     Diagnosis     ICD-10-CM    1. Generalized weakness  R53.1                Scribe Disclosure:  I, Aki Viera, am serving as a scribe at 4:13 AM on 5/29/2025 to document services personally performed by Kem España MD based on my observations and the provider's statements to me.        Kem España MD  05/29/25 2075

## 2025-05-30 ENCOUNTER — APPOINTMENT (OUTPATIENT)
Dept: PHYSICAL THERAPY | Facility: CLINIC | Age: 85
End: 2025-05-30
Attending: INTERNAL MEDICINE
Payer: MEDICARE

## 2025-05-30 ENCOUNTER — APPOINTMENT (OUTPATIENT)
Dept: CARDIOLOGY | Facility: CLINIC | Age: 85
End: 2025-05-30
Attending: INTERNAL MEDICINE
Payer: MEDICARE

## 2025-05-30 ENCOUNTER — ORDERS ONLY (AUTO-RELEASED) (OUTPATIENT)
Dept: MEDSURG UNIT | Facility: CLINIC | Age: 85
End: 2025-05-30

## 2025-05-30 VITALS
OXYGEN SATURATION: 98 % | WEIGHT: 160.72 LBS | SYSTOLIC BLOOD PRESSURE: 140 MMHG | DIASTOLIC BLOOD PRESSURE: 64 MMHG | RESPIRATION RATE: 16 BRPM | TEMPERATURE: 97.5 F | BODY MASS INDEX: 31.55 KG/M2 | HEART RATE: 63 BPM | HEIGHT: 60 IN

## 2025-05-30 DIAGNOSIS — G45.9 TIA (TRANSIENT ISCHEMIC ATTACK): ICD-10-CM

## 2025-05-30 LAB
ANION GAP SERPL CALCULATED.3IONS-SCNC: 11 MMOL/L (ref 7–15)
BUN SERPL-MCNC: 18.4 MG/DL (ref 8–23)
CALCIUM SERPL-MCNC: 9 MG/DL (ref 8.8–10.4)
CHLORIDE SERPL-SCNC: 101 MMOL/L (ref 98–107)
CREAT SERPL-MCNC: 0.77 MG/DL (ref 0.51–0.95)
EGFRCR SERPLBLD CKD-EPI 2021: 76 ML/MIN/1.73M2
GLUCOSE SERPL-MCNC: 136 MG/DL (ref 70–99)
HCO3 SERPL-SCNC: 24 MMOL/L (ref 22–29)
LVEF ECHO: NORMAL
POTASSIUM SERPL-SCNC: 4.1 MMOL/L (ref 3.4–5.3)
SODIUM SERPL-SCNC: 136 MMOL/L (ref 135–145)

## 2025-05-30 PROCEDURE — G0378 HOSPITAL OBSERVATION PER HR: HCPCS

## 2025-05-30 PROCEDURE — 250N000013 HC RX MED GY IP 250 OP 250 PS 637: Performed by: INTERNAL MEDICINE

## 2025-05-30 PROCEDURE — 97161 PT EVAL LOW COMPLEX 20 MIN: CPT | Mod: GP

## 2025-05-30 PROCEDURE — 99223 1ST HOSP IP/OBS HIGH 75: CPT | Mod: FS | Performed by: NURSE PRACTITIONER

## 2025-05-30 PROCEDURE — 82947 ASSAY GLUCOSE BLOOD QUANT: CPT | Performed by: INTERNAL MEDICINE

## 2025-05-30 PROCEDURE — 36415 COLL VENOUS BLD VENIPUNCTURE: CPT | Performed by: INTERNAL MEDICINE

## 2025-05-30 PROCEDURE — 99239 HOSP IP/OBS DSCHRG MGMT >30: CPT | Performed by: INTERNAL MEDICINE

## 2025-05-30 PROCEDURE — 93306 TTE W/DOPPLER COMPLETE: CPT | Mod: 26 | Performed by: INTERNAL MEDICINE

## 2025-05-30 PROCEDURE — 999N000208 ECHOCARDIOGRAM COMPLETE

## 2025-05-30 RX ORDER — ASPIRIN 81 MG/1
81 TABLET, COATED ORAL DAILY
Qty: 60 TABLET | Refills: 0 | Status: SHIPPED | OUTPATIENT
Start: 2025-05-31

## 2025-05-30 RX ADMIN — ASPIRIN 81 MG: 81 TABLET, COATED ORAL at 08:21

## 2025-05-30 RX ADMIN — ATENOLOL 100 MG: 50 TABLET ORAL at 08:21

## 2025-05-30 ASSESSMENT — ACTIVITIES OF DAILY LIVING (ADL)
ADLS_ACUITY_SCORE: 58
ADLS_ACUITY_SCORE: 57
ADLS_ACUITY_SCORE: 57
ADLS_ACUITY_SCORE: 58
ADLS_ACUITY_SCORE: 57
ADLS_ACUITY_SCORE: 57
ADLS_ACUITY_SCORE: 58
ADLS_ACUITY_SCORE: 57
ADLS_ACUITY_SCORE: 58
ADLS_ACUITY_SCORE: 57

## 2025-05-30 NOTE — DISCHARGE SUMMARY
[facility]  Hospitalist Discharge Summary      Date of Admission:  5/29/2025  Date of Discharge:  5/30/2025  3:57 PM  Discharging Provider: Virgen Jaime MD  Discharge Service: Hospitalist Service    Discharge Diagnoses   TIA  Left ICA aneurysm  Hyponatremia, resolved  Hypokalemia, replaced  HTN  Subclinical hypothyroidism    Clinically Significant Risk Factors     # Obesity: Estimated body mass index is 31.39 kg/m  as calculated from the following:    Height as of this encounter: 1.524 m (5').    Weight as of this encounter: 72.9 kg (160 lb 11.5 oz).       Follow-ups Needed After Discharge   Follow-up Appointments       Follow Up      Follow up with neuro IR    Follow-up with stroke neurology in 6 to 8 weeks.        Hospital Follow-up with Existing Primary Care Provider (PCP)          Schedule Primary Care visit within: 7 Days   Recommended labs and Imaging (to be ordered by Primary Care Provider): BMP               Unresulted Labs Ordered in the Past 30 Days of this Admission       No orders found for last 31 day(s).            Discharge Disposition   Discharged to home  Condition at discharge: Good    Hospital Course   Nan Sanford is a 85 year old female with past medical history significant for hypertension who presents with left facial droop and left hand numbness/weakness. Admitted on 5/29/2025. For a detailed HPI- please refer to H&P done by Dr Santos Mafyield on 05/29/2025.     TIA   Left ICA aneurysm   *Presents with left facial droop and left hand numbness/weakness that started on last Saturday; Left hand symptoms lasted several hours and resolved, left facial droop persisted longer; eventually, all her symtpms resolved after 1.5 days    *Head CT no acute abnormality.   *CTA head with no large vessel occlusion or flow-limiting stenosis, 3 mm aneurysm arising in the supraclinoid segment of the distal left internal carotid artery.  *CTA neck no measurable stenosis or  dissection  *MRI brain negative for stroke;  1.4 cm T2 hyperintense, T1 hypointense enhancing lesion in the left frontal calvarium. This is indeterminant and may represent an atypical hemangioma.     - Neurology (stroke) consulted   - started on Aspirin 81 mg po daily  - registered under Observation status  - EKG- NSR, no ischemic changes  - Telemetry- no evidence of arrhythmia   - HbA1c 5.4, Troponin 11--11  - lipid profile- LDL 84, total cholest 143  - initially PTA BP meds held to allow permissive HTN  - Echo - normal EF 55-60%, negative bubble study  - discussed with Stroke Neurology who recommended to discharge on ASA 81 mg po daily; statin deferred as per Stroke Neuro  - Zio patch ordered at the time of the discharge   - follow up with PCP in 12- weeks  - follow up with stroke LYNN in 6-8 weeks  - follow up with Neuro IR for left ICA aneurysm and left calvarium lesion/suspect atypical hemangioma  - symptoms- free at the time of the discharge; seen by PT- she is back to baseline, ambulatory without device    Hypertension  - PTA on Atenolol 100 mg po daily and Lisinopril/hydrochlorothiazide- held on admission to allow permissive HTN- plan to resume after discharge     Hyponatremia, mild, resolved  Hypokalemia, corrected  *Sodium 133; K 3.1; PTA on hydrochlorothiazide, labs historically normal on this. Reports decreased intake over the past few days.   *Received 1L LR in ED  - K replaced as per protocol  - 5/30 Na 136, K 4.1  - plan to resume Lisinopril/hydrochlorothiazide after discharge at this time but consider change from hydrochlorothiazide if recurrent hypokalemia/hyponatremia in the future.     Subclinical hyperthyroidism  Not on medications PTA  - Noted     Consultations This Hospital Stay   SMOKING CESSATION PROGRAM IP CONSULT  NEUROLOGY IP STROKE CONSULT  PHYSICAL THERAPY ADULT IP CONSULT    Code Status   Full Code    Time Spent on this Encounter   I, Virgen Jaime MD, personally saw the patient  today and spent greater than 30 minutes discharging this patient.       Virgen Jaime MD  [facility] NEUROSCIENCE UNIT  [address] [place] [address]  Phone: [phone]  ______________________________________________________________________    Physical Exam   Vital Signs: Temp: 97.5  F (36.4  C) Temp src: Oral BP: (!) 140/64 Pulse: 63   Resp: 16 SpO2: 98 % O2 Device: None (Room air)    Weight: 160 lbs 11.45 oz  General Appearance: Awake, alert, NAD, very pleasant  Respiratory: bilateral air entry, no wheezing, no rales, no crackles  Cardiovascular: S1S2, RRR, no murmurs, no rubs  GI: abd- soft, nonT, BS present  Skin: no rashes, no cyanosis  Neuro: AAOX3, no FNDs        Primary Care Physician   Jessica Menjivar    Discharge Orders      Adult Neurosurgery [facility] Referral      Reason for your hospital stay    You are admitted for evaluation of left-sided facial droop and numbness that lasted approximately 1 day and a half.  The symptoms resolved by itself.  CT of the head and MRI of the brain were negative for acute stroke.  Your symptoms were likely related to a TIA (transient ischemic attack).  You are seen by stroke neurology and aspirin 81 mg daily was recommended.  Your echocardiogram was normal but cardiac event monitor was recommended (the monitor will monitor your heart rate as outpatient).  The monitor will be mailed out to you.  You should follow-up with neuro IR for a small brain aneurysm found on CT angiogram.     Activity    Your activity upon discharge: activity as tolerated     Follow Up    Follow up with neuro IR    Follow-up with stroke neurology in 6 to 8 weeks.     When to contact your care team    Call your primary doctor or return to ER if you have any of the following: temperature greater than 100.5 or less than 96, chills, slurred speech, recurrent facial droop, severe headache, blurry vision, numbness or weakness in arms or legs, dizziness, loss of  consciousness, chest pain, shortness of breath, palpitations.     Diet    Follow this diet upon discharge: Current Diet:Orders Placed This Encounter      Combination Diet Regular Diet     Stroke Hospital Follow Up (for neurologist use only)    Please be aware that coverage of these services is subject to the terms and limitations of your health insurance plan.  Call member services at your health plan with any benefit or coverage questions.  [employer] will call you to coordinate care as prescribed by your provider. If you don t hear from a representative within 2 business days, please call [phone].       Hospital Follow-up with Existing Primary Care Provider (PCP)          ZIO PATCH MAIL OUT       Significant Results and Procedures   Most Recent 3 CBC's:  Recent Labs   Lab Test 05/29/25 0346 02/16/24  0825 03/25/19  0042   WBC 6.6 6.0 7.3   HGB 13.8 12.9 13.1   MCV 90 94 98    212 203     Most Recent 3 BMP's:  Recent Labs   Lab Test 05/30/25  0742 05/29/25 2237 05/29/25 0346 02/16/24  0825     --  133* 138   POTASSIUM 4.1 4.0 3.1* 4.0   CHLORIDE 101  --  93* 101   CO2 24  --  25 28   BUN 18.4  --  25.9* 21.9   CR 0.77  --  0.76 0.77   ANIONGAP 11  --  15 9   BRENNEN 9.0  --  9.4 8.9   *  --  100* 103*     Most Recent 2 LFT's:  Recent Labs   Lab Test 05/29/25 0346 03/25/19  0042   AST 23 35   ALT 16 34   ALKPHOS 83 74   BILITOTAL 0.4 0.1*     Most Recent 3 INR's:No lab results found.  Most Recent 3 Creatinines:  Recent Labs   Lab Test 05/30/25  0742 05/29/25 0346 02/16/24 0825   CR 0.77 0.76 0.77     Most Recent 3 Hemoglobins:  Recent Labs   Lab Test 05/29/25 0346 02/16/24  0825 03/25/19  0042   HGB 13.8 12.9 13.1     Most Recent 3 Troponin's:  Recent Labs   Lab Test 03/25/19 0042   TROPI <0.015     Most Recent Cholesterol Panel:  Recent Labs   Lab Test 05/29/25  0810   CHOL 143   LDL 84   HDL 47*   TRIG 58     7-Day Micro Results       No results found for the last 168 hours.           Most Recent TSH and T4:No lab results found.  Most Recent Hemoglobin A1c:  Recent Labs   Lab Test 05/29/25  0346   A1C 5.4     Most Recent 6 glucoses:  Recent Labs   Lab Test 05/30/25  0742 05/29/25  0346 02/16/24  0825 03/25/19  0042 04/07/17  0300   * 100* 103* 109* 96   ,   Results for orders placed or performed during the hospital encounter of 05/29/25   Head CT w/o contrast    Narrative    EXAM: CTA HEAD NECK W CONTRAST, CT HEAD W/O CONTRAST  LOCATION: [facility]  DATE: 5/29/2025    INDICATION: Weakness, left-sided facial droop 5 days ago  COMPARISON: 2/16/2024  CONTRAST: 67 mL Isovue   370  TECHNIQUE: Head and neck CT angiogram with IV contrast. Noncontrast head CT followed by axial helical CT images of the head and neck vessels obtained during the arterial phase of intravenous contrast administration. Axial 2D reconstructed images and   multiplanar 3D MIP reconstructed images of the head and neck vessels were performed by the technologist. Dose reduction techniques were used. All stenosis measurements made according to NASCET criteria unless otherwise specified.    FINDINGS:   NONCONTRAST HEAD CT:   INTRACRANIAL CONTENTS: No intracranial hemorrhage, extraaxial collection, or mass effect.  No CT evidence of acute infarct. Mild presumed chronic small vessel ischemic changes. Mild to moderate generalized volume loss. No hydrocephalus.     VISUALIZED ORBITS/SINUSES/MASTOIDS: No intraorbital abnormality. No paranasal sinus mucosal disease. No middle ear or mastoid effusion.    BONES/SOFT TISSUES: No acute abnormality.    HEAD CTA:  ANTERIOR CIRCULATION: No large vessel occlusion or flow-limiting stenosis. There is a 3 mm aneurysm arising in the supraclinoid segment of the distal left internal carotid artery which projects inferiorly. Standard [place] anatomy.    POSTERIOR CIRCULATION: No stenosis/occlusion, aneurysm, or high flow vascular malformation. Dominant  left and smaller right vertebral artery contribute to a normal basilar artery.     DURAL VENOUS SINUSES: Expected enhancement of the major dural venous sinuses.    NECK CTA:  RIGHT CAROTID: No measurable stenosis or dissection.    LEFT CAROTID: No mild atheromatous change. Measurable stenosis or dissection.    VERTEBRAL ARTERIES: No focal stenosis or dissection. Dominant left and smaller right vertebral arteries.    AORTIC ARCH: Bovine origin left common carotid artery. No significant stenosis at the origin of the great vessels.    NONVASCULAR STRUCTURES: Multinodular goiter.      Impression    IMPRESSION:   HEAD CT:  1.  No CT evidence for acute intracranial process.  2.  Brain atrophy and presumed chronic microvascular ischemic changes as above.    HEAD CTA:  1.  No large vessel occlusion or flow-limiting stenosis.  2.  3 mm aneurysm arising in the supraclinoid segment of the distal left internal carotid artery.    NECK CTA:  No measurable stenosis or dissection.                CTA Head Neck with Contrast    Narrative    EXAM: CTA HEAD NECK W CONTRAST, CT HEAD W/O CONTRAST  LOCATION: [facility]  DATE: 5/29/2025    INDICATION: Weakness, left-sided facial droop 5 days ago  COMPARISON: 2/16/2024  CONTRAST: 67 mL Isovue   370  TECHNIQUE: Head and neck CT angiogram with IV contrast. Noncontrast head CT followed by axial helical CT images of the head and neck vessels obtained during the arterial phase of intravenous contrast administration. Axial 2D reconstructed images and   multiplanar 3D MIP reconstructed images of the head and neck vessels were performed by the technologist. Dose reduction techniques were used. All stenosis measurements made according to NASCET criteria unless otherwise specified.    FINDINGS:   NONCONTRAST HEAD CT:   INTRACRANIAL CONTENTS: No intracranial hemorrhage, extraaxial collection, or mass effect.  No CT evidence of acute infarct. Mild presumed chronic small vessel  ischemic changes. Mild to moderate generalized volume loss. No hydrocephalus.     VISUALIZED ORBITS/SINUSES/MASTOIDS: No intraorbital abnormality. No paranasal sinus mucosal disease. No middle ear or mastoid effusion.    BONES/SOFT TISSUES: No acute abnormality.    HEAD CTA:  ANTERIOR CIRCULATION: No large vessel occlusion or flow-limiting stenosis. There is a 3 mm aneurysm arising in the supraclinoid segment of the distal left internal carotid artery which projects inferiorly. Standard [place] anatomy.    POSTERIOR CIRCULATION: No stenosis/occlusion, aneurysm, or high flow vascular malformation. Dominant left and smaller right vertebral artery contribute to a normal basilar artery.     DURAL VENOUS SINUSES: Expected enhancement of the major dural venous sinuses.    NECK CTA:  RIGHT CAROTID: No measurable stenosis or dissection.    LEFT CAROTID: No mild atheromatous change. Measurable stenosis or dissection.    VERTEBRAL ARTERIES: No focal stenosis or dissection. Dominant left and smaller right vertebral arteries.    AORTIC ARCH: Bovine origin left common carotid artery. No significant stenosis at the origin of the great vessels.    NONVASCULAR STRUCTURES: Multinodular goiter.      Impression    IMPRESSION:   HEAD CT:  1.  No CT evidence for acute intracranial process.  2.  Brain atrophy and presumed chronic microvascular ischemic changes as above.    HEAD CTA:  1.  No large vessel occlusion or flow-limiting stenosis.  2.  3 mm aneurysm arising in the supraclinoid segment of the distal left internal carotid artery.    NECK CTA:  No measurable stenosis or dissection.                MR Brain w/o & w Contrast    Narrative    EXAM: MR BRAIN W/O and W CONTRAST  LOCATION: [facility]  DATE: 5/29/2025    INDICATION: left facial droop  COMPARISON: CT in CTA from earlier same-day.  CONTRAST: 7 mL GADAVIST  TECHNIQUE: Routine multiplanar multisequence head MRI without and with intravenous  contrast.    FINDINGS:  INTRACRANIAL CONTENTS: No acute or subacute infarct. No mass, acute hemorrhage, or extra-axial fluid collections. Scattered nonspecific T2/FLAIR hyperintensities within the cerebral white matter most consistent with mild chronic microvascular ischemic   change. Mild generalized cerebral volume loss. Normal ventricles and sulci. Normal position of the cerebellar tonsils. No pathologic contrast enhancement.    SELLA: No abnormality accounting for technique.    OSSEOUS STRUCTURES/SOFT TISSUES: 1.4 cm T2 hyperintense, T1 hypointense enhancing lesion in the left frontal calvarium. This is indeterminant and may represent an atypical hemangioma. The major intracranial vascular flow voids are maintained. Midline   cystic scalp lesions, likely representing inclusion cysts.    ORBITS: Prior bilateral cataract surgery. Visualized portions of the orbits are otherwise unremarkable.     SINUSES/MASTOIDS: No paranasal sinus mucosal disease. No middle ear or mastoid effusion.       Impression    IMPRESSION:  1.  No acute intracranial pathology.  2.  Mild chronic small vessel ischemic disease and generalized cerebral volume loss.  3.  1.4 cm T2 hyperintense, T1 hypointense enhancing lesion in the left frontal calvarium. This is indeterminant and may represent an atypical hemangioma.     Echocardiogram Complete     Value    LVEF  55-60%    Narrative    [id number]  [id number]  [id number]^RAJ^IRAJ^PRATEEK     [facility]  Echocardiography Laboratory  [address] [place] [address]     Name: KM HIGGINBOTHAM  MRN: [id number]  : 1940  Study Date: 2025 11:00 AM  Age: 85 yrs  Gender: Female  Patient Location: [facility]  Reason For Study: TIA  Ordering Physician: IRAJ ANTHONY  Referring Physician: Jessica Menjivar MD  Performed By: Izabel Mancia     BSA: 1.7 m2  Height: 60 in  Weight: 160 lb  HR: 54  BP: 151/70  mmHg  ______________________________________________________________________________  Procedure  Bubble Echocardiogram with two-dimensional, color and spectral Doppler.  ______________________________________________________________________________  Interpretation Summary     A contrast injection (Bubble Study) was performed that was negative for flow  across the interatrial septum.  There is no color Doppler evidence of an atrial shunt.  The visual ejection fraction is 55-60%.  Left ventricular systolic function is normal.  ______________________________________________________________________________  Left Ventricle  The left ventricle is normal in size. There is normal left ventricular wall  thickness. Diastolic Doppler findings (E/E' ratio and/or other parameters)  suggest left ventricular filling pressures are indeterminate. The visual  ejection fraction is 55-60%. Left ventricular systolic function is normal.     Right Ventricle  The right ventricle is normal in size and function.     Atria  Normal left atrial size. Right atrial size is normal. There is no color  Doppler evidence of an atrial shunt. A contrast injection (Bubble Study) was  performed that was negative for flow across the interatrial septum.     Mitral Valve  There is trace mitral regurgitation.     Tricuspid Valve  There is mild (1+) tricuspid regurgitation.     Aortic Valve  The aortic valve is trileaflet. There is mild trileaflet aortic sclerosis. No  aortic regurgitation is present. No hemodynamically significant valvular  aortic stenosis.     Pulmonic Valve  There is no pulmonic valvular regurgitation. Normal pulmonic valve velocity.     Vessels  The aortic root is normal size. Normal size ascending aorta.     Rhythm  The rhythm was sinus bradycardia.     ______________________________________________________________________________  MMode/2D Measurements & Calculations  IVSd: 1.0 cm  LVIDd: 4.4 cm  LVIDs: 3.0 cm  LVPWd: 1.1 cm  FS: 33.1  %  LV mass(C)d: 157.7 grams  LV mass(C)dI: 92.9 grams/m2     Ao root diam: 3.1 cm  asc Aorta Diam: 3.3 cm  Ao root diam index Ht(cm/m): 2.0  Ao root diam index BSA (cm/m2): 1.8  Asc Ao diam index BSA (cm/m2): 1.9  Asc Ao diam index Ht(cm/m): 2.2  LA Volume (BP): 46.9 ml  LA Volume Index (BP): 27.6 ml/m2  RV Base: 2.8 cm  RWT: 0.48     TAPSE: 2.1 cm     Doppler Measurements & Calculations  MV E max bharath: 68.1 cm/sec  MV A max bharath: 72.8 cm/sec  MV E/A: 0.94  MV dec time: 0.25 sec  PA acc time: 0.11 sec  TR max bharath: 255.8 cm/sec  TR max P.2 mmHg  E/E' av.9  Lateral E/e': 7.6  Medial E/e': 10.1  RV S Bharath: 9.7 cm/sec     ______________________________________________________________________________  Report approved by: Jasmeet Hurd MD on 2025 02:01 PM             Discharge Medications   Current Discharge Medication List        START taking these medications    Details   aspirin (ASA) 81 MG EC tablet Take 1 tablet (81 mg) by mouth daily.  Qty: 60 tablet, Refills: 0    Comments: Future refills by PCP Dr. Jessica Menjivar with phone number [phone].  Associated Diagnoses: TIA (transient ischemic attack)           CONTINUE these medications which have NOT CHANGED    Details   acetaminophen (TYLENOL) 500 MG tablet Take 500-1,000 mg by mouth every 6 hours as needed for mild pain      atenolol (TENORMIN) 50 MG tablet Take 100 mg by mouth daily      calcium-vitamin D (CALTRATE) 600-400 MG-UNIT per tablet Take 1 tablet by mouth daily      Cholecalciferol (VITAMIN D3 PO) Take 5,000 Units by mouth daily      lisinopril-hydrochlorothiazide (ZESTORETIC) 20-25 MG tablet Take 1 tablet by mouth daily.      multivitamin, therapeutic (THERA-VIT) TABS tablet Take 1 tablet by mouth daily.           Allergies   No Known Allergies

## 2025-05-30 NOTE — PROGRESS NOTES
"   05/30/25 1430   Appointment Info   Signing Clinician's Name / Credentials (PT) Caroline Lao, PT, DPT   Living Environment   People in Home alone   Current Living Arrangements condominium   Home Accessibility no concerns   Transportation Anticipated family or friend will provide;car, drives self   Self-Care   Usual Activity Tolerance moderate   Current Activity Tolerance moderate   Equipment Currently Used at Home none   Fall history within last six months no   Activity/Exercise/Self-Care Comment Independent at baseline without device. Has sons in town who can support her as needed   General Information   Onset of Illness/Injury or Date of Surgery 05/29/25   Referring Physician Virgen Jaime MD   Patient/Family Therapy Goals Statement (PT) to get better   Pertinent History of Current Problem (include personal factors and/or comorbidities that impact the POC) \"Nan Sanford is a 85 year old female with past medical history significant for hypertension who presents with left facial droop and left hand numbness/weakness. Admitted on 5/29/2025.      TIA   Left ICA aneurysm   \"   Existing Precautions/Restrictions fall   Weight-Bearing Status - LLE full weight-bearing   Weight-Bearing Status - RLE full weight-bearing   Cognition   Affect/Mental Status (Cognition) WNL   Pain Assessment   Patient Currently in Pain No   Integumentary/Edema   Integumentary/Edema no deficits were identifed   Posture    Posture Forward head position   Range of Motion (ROM)   Range of Motion ROM is WFL   Strength (Manual Muscle Testing)   Strength (Manual Muscle Testing) strength is WFL   Strength Comments even bilaterally   Bed Mobility   Bed Mobility no deficits identified   Transfers   Transfers no deficits identified   Gait/Stairs (Locomotion)   [place] (Gait) independent   Comment, (Gait/Stairs) no device, steady   Balance   Balance Comments steady   Sensory Examination   Sensory Perception patient reports no sensory " changes   Clinical Impression   Criteria for Skilled Therapeutic Intervention Evaluation only   Clinical Presentation (PT Evaluation Complexity) stable   Clinical Presentation Rationale clinical judgement   Clinical Decision Making (Complexity) low complexity   Risk & Benefits of therapy have been explained evaluation/treatment results reviewed;care plan/treatment goals reviewed;risks/benefits reviewed;current/potential barriers reviewed;participants voiced agreement with care plan;participants included;patient   PT Total Evaluation Time   PT Eval, Low Complexity Minutes (34714) 10   PT Discharge Planning   PT Plan eval only   PT Discharge Recommendation (DC Rec) home   PT Rationale for DC Rec Patient demonstrates all functional mobility she needs to d/c home with support of sons as needed. Ambulation without device   PT Brief overview of current status independent  (Goals of therapy will be to address safe mobility and make recs for d/c to next level of care. Pt and RN will continue to follow all falls risk precautions as documented by RN staff while hospitalized.)   PT Total Distance Amb During Session (feet) 400   Physical Therapy Time and Intention   Total Session Time (sum of timed and untimed services) 10

## 2025-05-30 NOTE — PROGRESS NOTES
Patient is A/O x 4, Vss, a-febrile, denies pain or shortness of breath, admitted with Left facial droop and left hand numbness, symptoms have resolved, neuros are intact, tele SR, Echo done this morning is normal, EF 55-60%, stroke neuro following, discharge pending.

## 2025-05-30 NOTE — CONSULTS
[facility]    Stroke Consult Note    Reason for Consult:  TIA    Chief Complaint: Generalized Weakness       HPI  Nan Sanford is a 85 year old female with PMHx significant for HTN, polymyalgia rheumatica. She presented to the ED 5/29 after 1.5 days of left sided facial weakness and numbness and left hand weakness and numbness. Initially, she was not going to come in for evaluation but then developed sudden nausea, malaise, and generalized weakness early yesterday morning prompting her to come in.     TIA Evaluation Summarized    MRI and/or Head CT No acute pathology, mild chronic small vessel disease, possible left frontal calvarium hemangioma    Intracranial Vasculature No LVO, no high grade stenosis, 3 mm left supraclinoid ICA aneurysm    Cervical Vasculature No high grade stenosis, no dissection      Echocardiogram EF 55-60%, negative bubble study, no atrial shunt, normal atria   EKG/Telemetry SB   Other Testing Not Applicable      LDL 5/29/2025: 84 mg/dL   A1C 5/29/2025: 5.4 %     Impression  #Left-sided facial weakness and numbness and left hand weakness and sensory deficit: MRI does not show acute stroke but given constellation of symptoms and duration, high suspicion for MRI negative stroke.  #Left supraclinoid ICA aneurysm    Recommendations   - Neurochecks and Vital Signs every 4 hours    - normotension   - Daily aspirin 81 mg for secondary stroke prevention  - defer statin therapy given LDL 84 and lack of ICAD   - Telemetry  - naveed, ordered   - Bedside Glucose Monitoring  - Nutrition: Mediterranean diet recommended   - Stroke Education  - Depression Screen  - Apnea screening questions  - Euthermia, Euglycemia    Patient Follow-up    - in the next 1-2 week(s) with PCP  - in 6-8 weeks with any stroke LYNN ([phone]), ordered   - with Neuro IR as planned    Thank you for this consult. No further stroke evaluation is recommended, so we will sign off. Please contact us  "with any additional questions.    Elisabeth Harley NP  Vascular Neurology    To page me or covering stroke neurology team member, click here: [facility]COM  Choose \"On Call\" tab at top, then select \"NEUROLOGY/ALL SITES\" from middle drop-down box, press Enter, then look for \"stroke\" or \"telestroke\" for your site.  _____________________________________________________    Clinically Significant Risk Factors Present on Admission        # Hypokalemia: Lowest K = 3.1 mmol/L in last 2 days, will replace as needed  # Hyponatremia: Lowest Na = 133 mmol/L in last 2 days, will monitor as appropriate  # Hypochloremia: Lowest Cl = 93 mmol/L in last 2 days, will monitor as appropriate          # Hypertension: Home medication list includes antihypertensive(s)           # Obesity: Estimated body mass index is 31.39 kg/m  as calculated from the following:    Height as of this encounter: 1.524 m (5').    Weight as of this encounter: 72.9 kg (160 lb 11.5 oz).              Past Medical History    Past Medical History:   Diagnosis Date    Hypertension     Ovarian cyst     Ruptured    Polymyalgia rheumatica      Medications   Home Meds  Prior to Admission medications    Medication Sig Start Date End Date Taking? Authorizing Provider   acetaminophen (TYLENOL) 500 MG tablet Take 500-1,000 mg by mouth every 6 hours as needed for mild pain   Yes Unknown, Entered By History   atenolol (TENORMIN) 50 MG tablet Take 100 mg by mouth daily   Yes Unknown, Entered By History   calcium-vitamin D (CALTRATE) 600-400 MG-UNIT per tablet Take 1 tablet by mouth daily   Yes Unknown, Entered By History   Cholecalciferol (VITAMIN D3 PO) Take 5,000 Units by mouth daily   Yes Unknown, Entered By History   lisinopril-hydrochlorothiazide (ZESTORETIC) 20-25 MG tablet Take 1 tablet by mouth daily.   Yes Unknown, Entered By History   multivitamin, therapeutic (THERA-VIT) TABS tablet Take 1 tablet by mouth daily.   Yes Unknown, Entered By History       Scheduled " Meds  Current Facility-Administered Medications   Medication Dose Route Frequency Provider Last Rate Last Admin    aspirin EC tablet 81 mg  81 mg Oral Daily Santos Mayfield MD   81 mg at 05/30/25 0821    atenolol (TENORMIN) tablet 100 mg  100 mg Oral Daily Santos Mayfield MD   100 mg at 05/30/25 0821    [Held by provider] lisinopril-hydrochlorothiazide (ZESTORETIC) 20-25 MG per tablet 1 tablet  1 tablet Oral Daily Santos Mayfield MD           Infusion Meds  Current Facility-Administered Medications   Medication Dose Route Frequency Provider Last Rate Last Admin       Allergies   No Known Allergies       PHYSICAL EXAMINATION   Temp:  [97.4  F (36.3  C)-97.7  F (36.5  C)] 97.5  F (36.4  C)  Pulse:  [60-75] 63  Resp:  [16-18] 16  BP: (140-172)/(58-76) 140/64  SpO2:  [93 %-98 %] 98 %    Neurologic  Mental Status:  alert, oriented x 3, follows commands, speech clear and fluent, naming and repetition normal  Cranial Nerves:  visual fields intact, PERRL, EOMI with normal smooth pursuit, facial sensation intact and symmetric, facial movements symmetric, hearing not formally tested but intact to conversation, no dysarthria, shoulder shrug strong bilaterally, tongue protrusion midline  Motor:  normal muscle tone and bulk, no abnormal movements, able to move all limbs spontaneously, strength 5/5 throughout upper and lower extremities, no pronator drift  Sensory:  light touch sensation intact and symmetric throughout upper and lower extremities, no extinction on double simultaneous stimulation   Coordination:  normal finger-to-nose and heel-to-shin bilaterally without dysmetria  Station/Gait:  deferred    Stroke Scales     NIHSS  1a. Level of Consciousness 0-->Alert, keenly responsive   1b. LOC Questions 0-->Answers both questions correctly   1c. LOC Commands 0-->Performs both tasks correctly   2.   Best Gaze 0-->Normal   3.   Visual 0-->No visual loss   4.   Facial Palsy 0-->Normal symmetrical movements    5a. Motor Arm, Left 0-->No drift, limb holds 90 (or 45) degrees for full 10 secs   5b. Motor Arm, Right 0-->No drift, limb holds 90 (or 45) degrees for full 10 secs   6a. Motor Leg, Left 0-->No drift, leg holds 30 degree position for full 5 secs   6b. Motor Leg, right 0-->No drift, leg holds 30 degree position for full 5 secs   7.   Limb Ataxia 0-->Absent   8.   Sensory 0-->Normal, no sensory loss   9.   Best Language 0-->No aphasia, normal   10. Dysarthria 0-->Normal   11. Extinction and Inattention  0-->No abnormality   Total 0 (05/30/25 1432)       Imaging  I personally reviewed all imaging; relevant findings per HPI.    Labs Data   CBC  Recent Labs   Lab 05/29/25  0346   WBC 6.6   RBC 4.32   HGB 13.8   HCT 38.7        Basic Metabolic Panel   Recent Labs   Lab 05/30/25  0742 05/29/25  2237 05/29/25  0346     --  133*   POTASSIUM 4.1 4.0 3.1*   CHLORIDE 101  --  93*   CO2 24  --  25   BUN 18.4  --  25.9*   CR 0.77  --  0.76   *  --  100*   BRENNEN 9.0  --  9.4     Liver Panel  Recent Labs   Lab 05/29/25  0346   PROTTOTAL 6.6   ALBUMIN 3.8   BILITOTAL 0.4   ALKPHOS 83   AST 23   ALT 16     INR  No lab results found.        Stroke Consult Data Data   This was a non-emergent, non-telestroke consult.  I have personally spent a total of 40 minutes providing care today, time spent in reviewing medical records and devising the plan as recorded above.

## 2025-05-30 NOTE — PLAN OF CARE
Goal Outcome Evaluation:         Summary:      Shift: 5-29-25, [id number]  Summary: here for L face droop and L hand numbness sense resolved  Cognition: A/O x 4, nueros intact  Vitals: VSS on RA  Pain: denies  Activity: SBA  Diet: reg  Bladder/bowel: contnent, no BM this shift last BM 5-28  Labs/BG: pot/mag/phos protocols. pot 3.1, replaced now at 4  Tele: NSR  IV: SL  Consults: [facility]  Discharge: home TBD  Tests: Echo 5-30, possible GERMAN per nuero  Other:  No nausea, SOB. Lung sounds clear. Bowel sounds active. CMS intact. Continue to monitor.

## 2025-06-01 ENCOUNTER — PATIENT OUTREACH (OUTPATIENT)
Dept: CARE COORDINATION | Facility: CLINIC | Age: 85
End: 2025-06-01
Payer: MEDICARE

## 2025-06-01 NOTE — PROGRESS NOTES
Clinic Care Coordination Contact  Transitions of Care Outreach  Chief Complaint   Patient presents with    Clinic Care Coordination - [facility]       Most Recent Admission Date: 5/29/2025   Most Recent Admission Diagnosis: Cerebral aneurysm, nonruptured - I67.1  TIA (transient ischemic attack) - G45.9  Generalized weakness - R53.1     Most Recent Discharge Date: 5/30/2025   Most Recent Discharge Diagnosis: Generalized weakness - R53.1  Cerebral aneurysm, nonruptured - I67.1  TIA (transient ischemic attack) - G45.9     Transitions of Care Assessment    Discharge Assessment  How are you doing now that you are home?: well  How are your symptoms? (Red Flag symptoms escalate to triage hotline per guidelines): Improved  Do you know how to contact your clinic care team if you have future questions or changes to your health status? : Yes  Does the patient have their discharge instructions? : Yes  Does the patient have questions regarding their discharge instructions? : No  Were you started on any new medications or were there changes to any of your previous medications? : Yes  Does the patient have all of their medications?: Yes  Do you have questions regarding any of your medications? : No  Do you have all of your needed medical supplies or equipment (DME)?  (i.e. oxygen tank, CPAP, cane, etc.): Yes                  Follow up Plan     Discharge Follow-Up  Discharge follow up appointment scheduled in alignment with recommended follow up timeframe or Transitions of Risk Category? (Low = within 30 days; Moderate= within 14 days; High= within 7 days): Yes    Future Appointments   Date Time Provider Department Center   6/18/2025 10:30 AM Shlomo Moreland MD [facility]       Outpatient Plan as outlined on AVS reviewed with patient.    For any urgent concerns, please contact our 24 hour nurse triage line: [phone] ([phone])       LUCINA Moss

## 2025-06-18 ENCOUNTER — OFFICE VISIT (OUTPATIENT)
Dept: NEUROSURGERY | Facility: CLINIC | Age: 85
End: 2025-06-18
Payer: MEDICARE

## 2025-06-18 VITALS — DIASTOLIC BLOOD PRESSURE: 79 MMHG | OXYGEN SATURATION: 96 % | HEART RATE: 64 BPM | SYSTOLIC BLOOD PRESSURE: 150 MMHG

## 2025-06-18 DIAGNOSIS — I67.1 CEREBRAL ANEURYSM WITHOUT RUPTURE: Primary | ICD-10-CM

## 2025-06-18 DIAGNOSIS — I67.1 CEREBRAL ANEURYSM, NONRUPTURED: ICD-10-CM

## 2025-06-18 ASSESSMENT — PAIN SCALES - GENERAL: PAINLEVEL_OUTOF10: NO PAIN (0)

## 2025-06-18 NOTE — NURSING NOTE
Nan Sanford is a 85 year old female who presents for:  Chief Complaint   Patient presents with    RECHECK     Cerebral aneurysm, nonruptured        Initial Vitals:  BP (!) 150/79   Pulse 64   SpO2 96%  Estimated body mass index is 31.39 kg/m  as calculated from the following:    Height as of 5/29/25: 1.524 m (5').    Weight as of 5/29/25: 72.9 kg (160 lb 11.5 oz).. There is no height or weight on file to calculate BSA. BP completed using cuff size: regular  No Pain (0)    Nursing Comments:       Kelle Sam

## 2025-06-18 NOTE — PROGRESS NOTES
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    Nonsmoker, no FH of aneurysms. She is doing well today and has no new concerns.    Physical exam:   BP (!) 150/79   Pulse 64   SpO2 96%   General: Awake and alert and in no acute distress.  Pulm: Breathing comfortably on room air  Heart: RRR  Stomach: soft  Extremities: no edema    A&O x4  Speech without aphasia or dysarthria  CN- EOMI, VF intact, v1-3 sensation intact to light touch, face symmetric, hearing intact to voice, tongue midline  Motor- antigravity throughout  Sensation- intact to light touch  Coordination- finger to nose without ataxia  Gait- normal    Imaging:  All previous imaging pertinent to this encounter reviewed.    Assessment/Plan:  Left supraclinoid internal carotid artery aneurysm, measuring approx 3mm on CTA head and neck that was obtained as part of the stroke workup in 5/2025. We discussed repeating a CTA in 6 months as it is difficult to ascertain whether the aneurysm has changed from the MRA done in 2/2024. We discussed that the suspicion is low that there has been an interval growth, however will err on the side of caution and repeat imaging sooner than later.  - CTA head and neck in 6months followed by clinic visit    Pt discussed with Dr. Flores.  Purvi Sapp MD  Fellow, Endovascular Surgical Neuroradiology          REVIEWED ASSOCIATED CLINICAL DATA AND HISTORY FOUND IN THE MEDICAL RECORD:  Past Medical History:   Past Medical History:   Diagnosis Date    Hypertension   Telemetry called, pt's O2 sat dropped down to 73~75, increased to 85% with repositioning. Called RT for assistance.    Ovarian cyst     Ruptured    Polymyalgia rheumatica        Surgical History:   Past Surgical History:   Procedure Laterality Date    BREAST SURGERY      ENT SURGERY      GYN SURGERY      ORTHOPEDIC SURGERY         Social history:   Social History     Tobacco Use    Smoking status: Never   Substance Use Topics    Alcohol use: No    Drug use: No       Family history:   No family history on file.    Medications:  Current Outpatient Medications   Medication Sig Dispense Refill    acetaminophen (TYLENOL) 500 MG tablet Take 500-1,000 mg by mouth every 6 hours as needed for mild pain      aspirin (ASA) 81 MG EC tablet Take 1 tablet (81 mg) by mouth daily. 60 tablet 0    atenolol (TENORMIN) 50 MG tablet Take 100 mg by mouth daily      calcium-vitamin D (CALTRATE) 600-400 MG-UNIT per tablet Take 1 tablet by mouth daily      Cholecalciferol (VITAMIN D3 PO) Take 5,000 Units by mouth daily      lisinopril-hydrochlorothiazide (ZESTORETIC) 20-25 MG tablet Take 1 tablet by mouth daily.      multivitamin, therapeutic (THERA-VIT) TABS tablet Take 1 tablet by mouth daily.       No current facility-administered medications for this visit.       Allergies:   No Known Allergies

## 2025-06-18 NOTE — LETTER
6/18/2025      Nan Safnord  [address]  [facility] [address]      Dear Colleague,    Thank you for referring your patient, Nan Sanford, to the [facility]. Please see a copy of my visit note below.    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    Nonsmoker, no FH of aneurysms. She is doing well today and has no new concerns.    Physical exam:   BP (!) 150/79   Pulse 64   SpO2 96%   General: Awake and alert and in no acute distress.  Pulm: Breathing comfortably on room air  Heart: RRR  Stomach: soft  Extremities: no edema    A&O x4  Speech without aphasia or dysarthria  CN- EOMI, VF intact, v1-3 sensation intact to light touch, face symmetric, hearing intact to voice, tongue midline  Motor- antigravity throughout  Sensation- intact to light touch  Coordination- finger to nose without ataxia  Gait- normal    Imaging:  All previous imaging pertinent to this encounter reviewed.    Assessment/Plan:  Left supraclinoid internal carotid artery aneurysm, measuring approx 3mm on CTA head and neck that was obtained as part of the stroke workup in 5/2025. We discussed repeating a CTA in 6 months as it is difficult to ascertain whether the aneurysm has changed from the MRA done in 2/2024. We discussed that the suspicion is low that there has been an interval growth, however will err on the side of caution and repeat imaging sooner than later.  - CTA head and neck in 6months followed by clinic visit    Pt  discussed with Dr. Flores.  Purvi Sapp MD  Fellow, Endovascular Surgical Neuroradiology          REVIEWED ASSOCIATED CLINICAL DATA AND HISTORY FOUND IN THE MEDICAL RECORD:  Past Medical History:   Past Medical History:   Diagnosis Date     Hypertension      Ovarian cyst     Ruptured     Polymyalgia rheumatica        Surgical History:   Past Surgical History:   Procedure Laterality Date     BREAST SURGERY       ENT SURGERY       GYN SURGERY       ORTHOPEDIC SURGERY         Social history:   Social History     Tobacco Use     Smoking status: Never   Substance Use Topics     Alcohol use: No     Drug use: No       Family history:   No family history on file.    Medications:  Current Outpatient Medications   Medication Sig Dispense Refill     acetaminophen (TYLENOL) 500 MG tablet Take 500-1,000 mg by mouth every 6 hours as needed for mild pain       aspirin (ASA) 81 MG EC tablet Take 1 tablet (81 mg) by mouth daily. 60 tablet 0     atenolol (TENORMIN) 50 MG tablet Take 100 mg by mouth daily       calcium-vitamin D (CALTRATE) 600-400 MG-UNIT per tablet Take 1 tablet by mouth daily       Cholecalciferol (VITAMIN D3 PO) Take 5,000 Units by mouth daily       lisinopril-hydrochlorothiazide (ZESTORETIC) 20-25 MG tablet Take 1 tablet by mouth daily.       multivitamin, therapeutic (THERA-VIT) TABS tablet Take 1 tablet by mouth daily.       No current facility-administered medications for this visit.       Allergies:   No Known Allergies     Attestation signed by Shlomo Moreland MD at 6/19/2025  9:54 AM:  I agree with the above note by Dr. Sapp         on  6/18/25    Again, thank you for allowing me to participate in the care of your patient.        Sincerely,        Shlomo Moreland MD    Electronically signed

## 2025-06-28 LAB — CV ZIO PRELIM RESULTS: NORMAL

## 2025-08-07 ENCOUNTER — OFFICE VISIT (OUTPATIENT)
Dept: NEUROLOGY | Facility: CLINIC | Age: 85
End: 2025-08-07
Attending: NURSE PRACTITIONER
Payer: MEDICARE

## 2025-08-07 VITALS — HEART RATE: 55 BPM | SYSTOLIC BLOOD PRESSURE: 143 MMHG | DIASTOLIC BLOOD PRESSURE: 80 MMHG | OXYGEN SATURATION: 100 %

## 2025-08-07 DIAGNOSIS — G45.9 TIA (TRANSIENT ISCHEMIC ATTACK): ICD-10-CM

## 2025-08-07 RX ORDER — DABIGATRAN ETEXILATE 150 MG/1
150 CAPSULE ORAL 2 TIMES DAILY
Qty: 180 CAPSULE | Refills: 3 | Status: SHIPPED | OUTPATIENT
Start: 2025-08-07

## (undated) RX ORDER — LIDOCAINE HYDROCHLORIDE 10 MG/ML
INJECTION, SOLUTION EPIDURAL; INFILTRATION; INTRACAUDAL; PERINEURAL
Status: DISPENSED
Start: 2019-03-27

## (undated) RX ORDER — DEXAMETHASONE SODIUM PHOSPHATE 10 MG/ML
INJECTION, SOLUTION INTRAMUSCULAR; INTRAVENOUS
Status: DISPENSED
Start: 2019-03-27